# Patient Record
Sex: MALE | Race: WHITE | Employment: OTHER | URBAN - METROPOLITAN AREA
[De-identification: names, ages, dates, MRNs, and addresses within clinical notes are randomized per-mention and may not be internally consistent; named-entity substitution may affect disease eponyms.]

---

## 2023-05-03 DIAGNOSIS — I10 PRIMARY HYPERTENSION: ICD-10-CM

## 2023-05-03 DIAGNOSIS — K21.9 GASTROESOPHAGEAL REFLUX DISEASE WITHOUT ESOPHAGITIS: ICD-10-CM

## 2023-05-03 DIAGNOSIS — I10 PRIMARY HYPERTENSION: Primary | ICD-10-CM

## 2023-05-03 DIAGNOSIS — E78.2 MIXED HYPERLIPIDEMIA: ICD-10-CM

## 2023-05-03 RX ORDER — PANTOPRAZOLE SODIUM 40 MG/1
1 TABLET, DELAYED RELEASE ORAL 2 TIMES DAILY
COMMUNITY
Start: 2020-03-11 | End: 2023-05-03 | Stop reason: SDUPTHER

## 2023-05-03 RX ORDER — ATENOLOL 25 MG/1
25 TABLET ORAL DAILY
Qty: 90 TABLET | Refills: 3 | Status: SHIPPED | OUTPATIENT
Start: 2023-05-03 | End: 2023-05-04 | Stop reason: SDUPTHER

## 2023-05-03 RX ORDER — ATORVASTATIN CALCIUM 10 MG/1
1 TABLET, FILM COATED ORAL DAILY
COMMUNITY
Start: 2019-01-14 | End: 2023-05-03 | Stop reason: SDUPTHER

## 2023-05-03 RX ORDER — ATENOLOL 25 MG/1
25 TABLET ORAL DAILY
Qty: 90 TABLET | Refills: 0 | Status: SHIPPED | OUTPATIENT
Start: 2023-05-03 | End: 2023-05-03 | Stop reason: SDUPTHER

## 2023-05-03 RX ORDER — ATORVASTATIN CALCIUM 10 MG/1
10 TABLET, FILM COATED ORAL DAILY
Qty: 90 TABLET | Refills: 0 | Status: SHIPPED | OUTPATIENT
Start: 2023-05-03 | End: 2023-05-03 | Stop reason: SDUPTHER

## 2023-05-03 RX ORDER — PANTOPRAZOLE SODIUM 40 MG/1
40 TABLET, DELAYED RELEASE ORAL 2 TIMES DAILY
Qty: 90 TABLET | Refills: 3 | Status: SHIPPED | OUTPATIENT
Start: 2023-05-03 | End: 2023-05-04 | Stop reason: SDUPTHER

## 2023-05-03 RX ORDER — ATENOLOL 25 MG/1
1 TABLET ORAL DAILY
COMMUNITY
Start: 2020-11-05 | End: 2023-05-03 | Stop reason: SDUPTHER

## 2023-05-03 RX ORDER — PANTOPRAZOLE SODIUM 40 MG/1
40 TABLET, DELAYED RELEASE ORAL 2 TIMES DAILY
Qty: 90 TABLET | Refills: 0 | Status: SHIPPED | OUTPATIENT
Start: 2023-05-03 | End: 2023-05-03 | Stop reason: SDUPTHER

## 2023-05-03 RX ORDER — ATORVASTATIN CALCIUM 10 MG/1
10 TABLET, FILM COATED ORAL DAILY
Qty: 90 TABLET | Refills: 3 | Status: SHIPPED | OUTPATIENT
Start: 2023-05-03 | End: 2023-05-04 | Stop reason: SDUPTHER

## 2023-05-03 NOTE — TELEPHONE ENCOUNTER
----- Message from Alonzo Estrada sent at 5/2/2023  3:24 PM EDT -----  Regarding: Need prescription refills  Contact: [phone]  Gary Francois!    I typically receive a 90 day supply (through the mail) from the  Pharmacy in your building.    All three of my prescriptions are at zero refill.  Could you order a refill  for Atenolol, Pantoprazole, and Avorstatin?    Here's the rub - I had to create a new account in [facility] and my prescription details did not carry over.  My hope is that you have the specifics on file (?)      Thanks for your help!  Jose Antonio Estrada

## 2023-05-04 DIAGNOSIS — K21.9 GASTROESOPHAGEAL REFLUX DISEASE WITHOUT ESOPHAGITIS: ICD-10-CM

## 2023-05-04 DIAGNOSIS — I10 PRIMARY HYPERTENSION: ICD-10-CM

## 2023-05-04 RX ORDER — ATORVASTATIN CALCIUM 10 MG/1
10 TABLET, FILM COATED ORAL DAILY
Qty: 90 TABLET | Refills: 3 | Status: SHIPPED | OUTPATIENT
Start: 2023-05-04 | End: 2023-05-04

## 2023-05-04 RX ORDER — PANTOPRAZOLE SODIUM 40 MG/1
40 TABLET, DELAYED RELEASE ORAL 2 TIMES DAILY
Qty: 90 TABLET | Refills: 3 | Status: SHIPPED | OUTPATIENT
Start: 2023-05-04 | End: 2023-05-04

## 2023-05-04 RX ORDER — ATENOLOL 25 MG/1
25 TABLET ORAL DAILY
Qty: 90 TABLET | Refills: 3 | Status: SHIPPED | OUTPATIENT
Start: 2023-05-04 | End: 2023-05-04

## 2023-08-14 PROBLEM — B35.3 ATHLETE'S FOOT: Status: ACTIVE | Noted: 2023-08-14

## 2023-08-14 PROBLEM — H90.3 BILATERAL HIGH FREQUENCY SENSORINEURAL HEARING LOSS: Status: ACTIVE | Noted: 2023-08-14

## 2023-08-14 PROBLEM — R19.7 DIARRHEA: Status: ACTIVE | Noted: 2023-08-14

## 2023-08-14 PROBLEM — H52.13 MYOPIA OF BOTH EYES: Status: ACTIVE | Noted: 2023-08-14

## 2023-08-14 PROBLEM — K22.70 BARRETT'S ESOPHAGUS: Status: ACTIVE | Noted: 2023-08-14

## 2023-08-14 PROBLEM — I10 HTN (HYPERTENSION): Status: ACTIVE | Noted: 2023-08-14

## 2023-08-14 PROBLEM — K42.9 UMBILICAL HERNIA: Status: ACTIVE | Noted: 2023-08-14

## 2023-08-14 PROBLEM — E78.5 HYPERLIPIDEMIA: Status: ACTIVE | Noted: 2023-08-14

## 2023-08-14 PROBLEM — K21.9 GERD (GASTROESOPHAGEAL REFLUX DISEASE): Status: ACTIVE | Noted: 2023-08-14

## 2023-08-14 PROBLEM — L03.90 CELLULITIS: Status: ACTIVE | Noted: 2023-08-14

## 2023-08-14 PROBLEM — J30.2 SEASONAL ALLERGIES: Status: ACTIVE | Noted: 2023-08-14

## 2023-08-14 PROBLEM — H25.13 CATARACT, NUCLEAR SCLEROTIC, BOTH EYES: Status: ACTIVE | Noted: 2023-08-14

## 2023-08-15 DIAGNOSIS — Z00.00 HEALTHCARE MAINTENANCE: Primary | ICD-10-CM

## 2023-08-16 ENCOUNTER — LAB (OUTPATIENT)
Dept: LAB | Facility: LAB | Age: 63
End: 2023-08-16
Payer: COMMERCIAL

## 2023-08-16 DIAGNOSIS — Z00.00 HEALTHCARE MAINTENANCE: ICD-10-CM

## 2023-08-16 LAB
ALANINE AMINOTRANSFERASE (SGPT) (U/L) IN SER/PLAS: 21 U/L (ref 10–52)
ALBUMIN (G/DL) IN SER/PLAS: 3.9 G/DL (ref 3.4–5)
ALKALINE PHOSPHATASE (U/L) IN SER/PLAS: 61 U/L (ref 33–136)
ANION GAP IN SER/PLAS: 12 MMOL/L (ref 10–20)
ASPARTATE AMINOTRANSFERASE (SGOT) (U/L) IN SER/PLAS: 21 U/L (ref 9–39)
BASOPHILS (10*3/UL) IN BLOOD BY AUTOMATED COUNT: 0.07 X10E9/L (ref 0–0.1)
BASOPHILS/100 LEUKOCYTES IN BLOOD BY AUTOMATED COUNT: 1.2 % (ref 0–2)
BILIRUBIN TOTAL (MG/DL) IN SER/PLAS: 0.9 MG/DL (ref 0–1.2)
CALCIUM (MG/DL) IN SER/PLAS: 8.9 MG/DL (ref 8.6–10.3)
CARBON DIOXIDE, TOTAL (MMOL/L) IN SER/PLAS: 25 MMOL/L (ref 21–32)
CHLORIDE (MMOL/L) IN SER/PLAS: 109 MMOL/L (ref 98–107)
CHOLESTEROL (MG/DL) IN SER/PLAS: 122 MG/DL (ref 0–199)
CHOLESTEROL IN HDL (MG/DL) IN SER/PLAS: 41.9 MG/DL
CHOLESTEROL/HDL RATIO: 2.9
CREATININE (MG/DL) IN SER/PLAS: 0.99 MG/DL (ref 0.5–1.3)
EOSINOPHILS (10*3/UL) IN BLOOD BY AUTOMATED COUNT: 0.18 X10E9/L (ref 0–0.7)
EOSINOPHILS/100 LEUKOCYTES IN BLOOD BY AUTOMATED COUNT: 3.1 % (ref 0–6)
ERYTHROCYTE DISTRIBUTION WIDTH (RATIO) BY AUTOMATED COUNT: 12.1 % (ref 11.5–14.5)
ERYTHROCYTE MEAN CORPUSCULAR HEMOGLOBIN CONCENTRATION (G/DL) BY AUTOMATED: 34.2 G/DL (ref 32–36)
ERYTHROCYTE MEAN CORPUSCULAR VOLUME (FL) BY AUTOMATED COUNT: 91 FL (ref 80–100)
ERYTHROCYTES (10*6/UL) IN BLOOD BY AUTOMATED COUNT: 5.16 X10E12/L (ref 4.5–5.9)
GFR MALE: 86 ML/MIN/1.73M2
GLUCOSE (MG/DL) IN SER/PLAS: 99 MG/DL (ref 74–99)
HEMATOCRIT (%) IN BLOOD BY AUTOMATED COUNT: 47.1 % (ref 41–52)
HEMOGLOBIN (G/DL) IN BLOOD: 16.1 G/DL (ref 13.5–17.5)
IMMATURE GRANULOCYTES/100 LEUKOCYTES IN BLOOD BY AUTOMATED COUNT: 0.2 % (ref 0–0.9)
LDL: 56 MG/DL (ref 0–99)
LEUKOCYTES (10*3/UL) IN BLOOD BY AUTOMATED COUNT: 5.8 X10E9/L (ref 4.4–11.3)
LYMPHOCYTES (10*3/UL) IN BLOOD BY AUTOMATED COUNT: 1.03 X10E9/L (ref 1.2–4.8)
LYMPHOCYTES/100 LEUKOCYTES IN BLOOD BY AUTOMATED COUNT: 17.7 % (ref 13–44)
MONOCYTES (10*3/UL) IN BLOOD BY AUTOMATED COUNT: 0.63 X10E9/L (ref 0.1–1)
MONOCYTES/100 LEUKOCYTES IN BLOOD BY AUTOMATED COUNT: 10.8 % (ref 2–10)
NEUTROPHILS (10*3/UL) IN BLOOD BY AUTOMATED COUNT: 3.91 X10E9/L (ref 1.2–7.7)
NEUTROPHILS/100 LEUKOCYTES IN BLOOD BY AUTOMATED COUNT: 67 % (ref 40–80)
PLATELETS (10*3/UL) IN BLOOD AUTOMATED COUNT: 188 X10E9/L (ref 150–450)
POTASSIUM (MMOL/L) IN SER/PLAS: 4 MMOL/L (ref 3.5–5.3)
PROSTATE SPECIFIC AG (NG/ML) IN SER/PLAS: 1.32 NG/ML (ref 0–4)
PROTEIN TOTAL: 5.8 G/DL (ref 6.4–8.2)
SODIUM (MMOL/L) IN SER/PLAS: 142 MMOL/L (ref 136–145)
TRIGLYCERIDE (MG/DL) IN SER/PLAS: 121 MG/DL (ref 0–149)
UREA NITROGEN (MG/DL) IN SER/PLAS: 14 MG/DL (ref 6–23)
VLDL: 24 MG/DL (ref 0–40)

## 2023-08-16 PROCEDURE — 85025 COMPLETE CBC W/AUTO DIFF WBC: CPT

## 2023-08-16 PROCEDURE — 36415 COLL VENOUS BLD VENIPUNCTURE: CPT

## 2023-08-16 PROCEDURE — 80053 COMPREHEN METABOLIC PANEL: CPT

## 2023-08-16 PROCEDURE — 80061 LIPID PANEL: CPT

## 2023-08-16 PROCEDURE — 84153 ASSAY OF PSA TOTAL: CPT

## 2023-08-22 ENCOUNTER — OFFICE VISIT (OUTPATIENT)
Dept: PRIMARY CARE | Facility: CLINIC | Age: 63
End: 2023-08-22
Payer: COMMERCIAL

## 2023-08-22 VITALS
TEMPERATURE: 97.2 F | BODY MASS INDEX: 33.46 KG/M2 | HEART RATE: 58 BPM | RESPIRATION RATE: 16 BRPM | WEIGHT: 220.8 LBS | OXYGEN SATURATION: 98 % | SYSTOLIC BLOOD PRESSURE: 114 MMHG | DIASTOLIC BLOOD PRESSURE: 82 MMHG | HEIGHT: 68 IN

## 2023-08-22 DIAGNOSIS — Z00.00 HEALTHCARE MAINTENANCE: Primary | ICD-10-CM

## 2023-08-22 PROCEDURE — 99396 PREV VISIT EST AGE 40-64: CPT | Performed by: INTERNAL MEDICINE

## 2023-08-22 PROCEDURE — 3074F SYST BP LT 130 MM HG: CPT | Performed by: INTERNAL MEDICINE

## 2023-08-22 PROCEDURE — 1036F TOBACCO NON-USER: CPT | Performed by: INTERNAL MEDICINE

## 2023-08-22 PROCEDURE — 3079F DIAST BP 80-89 MM HG: CPT | Performed by: INTERNAL MEDICINE

## 2023-08-22 RX ORDER — CETIRIZINE HYDROCHLORIDE 5 MG/1
5 TABLET ORAL DAILY
COMMUNITY

## 2023-08-22 ASSESSMENT — PATIENT HEALTH QUESTIONNAIRE - PHQ9
SUM OF ALL RESPONSES TO PHQ9 QUESTIONS 1 AND 2: 0
2. FEELING DOWN, DEPRESSED OR HOPELESS: NOT AT ALL
1. LITTLE INTEREST OR PLEASURE IN DOING THINGS: NOT AT ALL

## 2023-08-22 ASSESSMENT — ENCOUNTER SYMPTOMS
DIARRHEA: 0
CONSTIPATION: 0
ROS GI COMMENTS: POSITIVE FOR HEARTBURN.
SHORTNESS OF BREATH: 1
BLOOD IN STOOL: 0

## 2023-08-22 NOTE — PROGRESS NOTES
The patient is here today for a physical exam.  The patient had a recent hearing test and will be now wearing two hearing aids.    Subjective   Patient ID: Alonzo Estrada is a 63 y.o. male who presents for Annual Exam.  He is generally doing well today.    The patient reports intermittent heartburn symptoms about once a month, that wakes him from sleep.  He is maintained with pantoprazole 40 mg once daily.  The patient plans to schedule a follow-up appointment with  from Gastroenterology.  He denies any hematochezia, melena, or bowel problems.     The patient mentions occasional mild dyspnea with exertion.  He denies any lower limb edema.    The patient recently completed audiometry and was fitted for a hearing aid device, that is working well.    The patient mentions hand numbness with specific sleep positions at night time.     The patient denies any vision changes.  He reports average sleep quality.       Review of Systems   Eyes:  Negative for visual disturbance.   Respiratory:  Positive for shortness of breath.    Cardiovascular:  Negative for leg swelling.   Gastrointestinal:  Negative for blood in stool, constipation and diarrhea.        Positive for heartburn.     Objective   Physical Exam  Constitutional:       Appearance: Normal appearance.   Neck:      Vascular: No carotid bruit.   Cardiovascular:      Rate and Rhythm: Normal rate and regular rhythm.      Heart sounds: Normal heart sounds.   Pulmonary:      Effort: Pulmonary effort is normal.      Breath sounds: Normal breath sounds.   Abdominal:      General: Bowel sounds are normal.      Palpations: Abdomen is soft.      Tenderness: There is no abdominal tenderness.   Skin:     General: Skin is warm and dry.   Neurological:      General: No focal deficit present.      Mental Status: He is alert and oriented to person, place, and time. Mental status is at baseline.   Psychiatric:         Mood and Affect: Mood normal.         Behavior:  Behavior normal.       Assessment/Plan       CPE Performed  -  Discussed healthy diet and regular exercise.    -  Physical exam overall unremarkable. Immunizations reviewed and updated accordingly. Healthy lifestyle choices discussed (tobacco avoidance, appropriate alcohol use, avoidance of illicit substances).   -  Patient is wearing seatbelt.   -  Screening lab work ordered as indicated.    -  Age appropriate screening tests reviewed with patient.        IMPRESSION/PLAN     Carpal Tunnel Syndrome   - Suspect this is coming from impingement at the elbow given his positioning at night when his symptoms start.  Advised the patient to try a wrist brace at night time to improve sleep.  Call the clinic if symptoms persist or worsen.    HTN   - /82 in office today, continue on atenolol 25mg QD.      HLD   - Stable, continue on atorvastatin 10mg QD.      Hamilton's   - Last EGD 12/2022, showed no evidence of Hamilton's Disease.  He continues with pantoprazole 40mg 1-2x/day. Will follow-up with Dr. Venegas in Gastroenterology.     Non-specific ST changes  - no chest pain currently- feeling well. Suspect normal variant. CT Cardiac score was 0. His BP is well controlled and he is on a statin.    Cellulitis in finger   - Previously ordered a referral with a Hand Surgeon.     Athlete's Foot   - Prescribed a topical antifungal     Health Maintenance   - Routine labs 8/2023. Last colonoscopy performed 09/2021. He is up to date on his TDAP and Covid-19 vaccinations.     Follow up for annual physical examination, call sooner if needed.        Scribe Attestation  By signing my name below, Nancie ALVAREZ Scribe   attest that this documentation has been prepared under the direction and in the presence of Cj Francois DO.

## 2023-10-06 ENCOUNTER — CLINICAL SUPPORT (OUTPATIENT)
Dept: AUDIOLOGY | Facility: CLINIC | Age: 63
End: 2023-10-06
Payer: COMMERCIAL

## 2023-10-06 DIAGNOSIS — H90.3 BILATERAL HIGH FREQUENCY SENSORINEURAL HEARING LOSS: Primary | ICD-10-CM

## 2023-10-06 ASSESSMENT — ENCOUNTER SYMPTOMS: OCCASIONAL FEELINGS OF UNSTEADINESS: 0

## 2023-10-06 ASSESSMENT — PAIN SCALES - GENERAL: PAINLEVEL_OUTOF10: 0 - NO PAIN

## 2023-10-06 ASSESSMENT — PAIN - FUNCTIONAL ASSESSMENT: PAIN_FUNCTIONAL_ASSESSMENT: 0-10

## 2023-10-06 NOTE — PROGRESS NOTES
AUDIOLOGY ADULT HEARING AID FITTING      Name:  Alonzo Estrada  :  1960  Age:  63 y.o.  Date of Service: 10/06/23    History:  Reason for visit:  Mr. Estrada is seen today for a hearing aid fitting of the below devices.   Chief Complaint   Patient presents with    Hearing Loss     Hearing Aid Fitting      The devices were programmed to meet the patient's initial needs.  Ran the feedback manager in each ear, with no feedback noticed in the office. Real ear measurements were performed at 100% acclimation and aids were adjusted to meet targets for soft (55 dB), average (65 dB) and loud (75 dB) speech sounds. Verification with real ear indicated targets were met in each ear. Began with 100% acclimation, however, the patient reported the devices were too loud. Decreased to 85% acclimation and in the office with quiet and background noise the patient was able to easily communicate at a distance of approximately 12-15 ft. Reported the aids sounded comfortable and clear and mentioned he felt he was hearing better then with the one aid alone. Stated although he could feel the right ear and it was slightly tickling he denied any pain in the ear. Slight concern for the right ear sensation and his own voice, however, stated he believes that he will become accustomed to the sound.  He was also provided a larger size dome to use if needed.     Right Ear:   Make/Model: Phonak Audeo L50 Slim   Dome/: small double dome/ #2 M   Serial Number: [id number]   Warranty: 2026 Loss & Damage and Repair    Left Ear:  Make/Model: Phonak Audeo L50 Slim   Dome/: small double dome/ #2 M   Serial Number: [id number]   Warranty: 2026 Loss & Damage and Repair  Using a large  for lithium ion batteries.   Aids dispensed on 10/06/2023 billed to  insurance Auth # [id number]     Device instructions were provided covering the following points:   1. Battery insertion and removal into the -the  patient understood.   2. Device insertion and removal-was able to insert/remove without difficulty and will monitor the fit and add retention lines if needed, especially to the right device.   3. Counseled to use the phone as normal without pressing anything additional on the hearing aid.     The patient did not have his apple information with him, so he reported he will download the simon at home and attempt to pair the hearing aids. If needed, will attempt to pair and configuration at the first follow up appointment.    Patient educated on:  1. Use/wear time for a minimum of 10 hours day.    2. Realistic expectations and need to return for fine-tuning  3. Communicating strategies to optimize hearing aid performance    Purchase agreement was completed and a copy was given to the patient.   Discussed the hearing aid warranty is provided by the hearing aid , and not [facility]. The  audiologist is available to facilitate device and accessory repairs under warranty.  However, it is the hearing aid  that is responsible for all costs related to repairs under warrantee. Charges for the replacement of a lost hearing aid under warrantee will be higher. Discussed that after the 90 initial fitting period,  Audiology will charge a fee to the patient for professional services to trouble shoot hearing aid device issues, to return the device to the manufacture for repair, to reprogram the device if needed, and to re-dispense the device to the patient. Office visit fees for hearing aid services are charged after the initial 90 day fitting period and the fee will vary based on the professional services provided, and can range from $50 - $200.  The patient expressed understanding for all fees and will return for the hearing aid fitting.     RECOMMENDATIONS:  * Continue medical follow up with managing physician.   * Wear hearing protection while in the presence of loud sounds.   * Continued use of  new hearing aids.   * Use effective communication strategies such as asking the speaker to gain attention prior to speaking, speaking in the same room, repeating words that were heard, etc.  * Return for scheduled follow up appointment.     PATIENT EDUCATION:   Questions were addressed and the patient was encouraged to contact our department should concerns arise.  The patient was seen from 9:00-10:00 am.

## 2023-10-27 ENCOUNTER — CLINICAL SUPPORT (OUTPATIENT)
Dept: AUDIOLOGY | Facility: CLINIC | Age: 63
End: 2023-10-27
Payer: COMMERCIAL

## 2023-10-27 DIAGNOSIS — H90.3 SENSORINEURAL HEARING LOSS (SNHL) OF BOTH EARS: Primary | ICD-10-CM

## 2023-10-27 NOTE — PROGRESS NOTES
AUDIOLOGY HEARING AID CHECK      Name:  Alonzo Estrada  :  1960  Age:  63 y.o.  Date of Visit: 10/27/23    History:  Reason for visit:  Mr. Estrada is seen today for a hearing aid check.  Chief Complaint   Patient presents with    Hearing Aid Check     New hearing aid in warranty check         Procedure:   Note, the patient is currently wearing the following devices:   Right Ear:              Make/Model: Phonak Audeo L50 Slim              Dome/: small double dome/ #2 M              Serial Number: [id number]              Warranty: 2026 Loss & Damage and Repair     Left Ear:  Make/Model: Phonak Audeo L50 Slim              Dome/: small double dome/ #2 M              Serial Number: [id number]              Warranty: 2026 Loss & Damage and Repair  Using a large  for lithium ion batteries.   Aids dispensed on 10/06/2023 billed to  insurance Auth # [id number]      The patient stated he has been performing well with the current hearing aids. Stated the devices are comfortable and has been staying in place.   Mentioned he initially thought the sound was loud, however, currently mentioned things are very comfortable. Believes it was initially due to the fact that he was wearing two aids instead of one. Reported he was able to hear all his environmental sounds like the microwave beeping even from a room away.   Stated he has been able to hear and understand speech and has wife has mentioned he has not been asking for as much repetition. He stated he has been really impressed with the sound and the aids.   Aided soundfield testing was performed with good results. Aided scores in the slight to mild hearing loss range with speech sounds at good or excellent results. See scanned materials.  Stated at this time he did not require any sound adjustments and would like to keep listening at his current program settings.  Paired the aids to his phone and discussed the [employer] simon.  Discussed the streaming feature on the phone and the differences needed to stream music/phone calls etc.   Demonstrated the cerushield wax trap and hearing aid cleaning.   Answered the patient's questions.   Recommended he return in approximately one month to double check programming. The patient was in agreement with the plant.     RECOMMENDATIONS:  * Continue medical follow up with managing physician.  * Continued use of new hearing aids.  * Use effective communication strategies such as asking the speaker to gain attention prior to speaking, speaking in the same room, repeating words that were heard, etc.   * Return for scheduled appointment.     PATIENT EDUCATION:   Questions were addressed and the patient was encouraged to contact our department should concerns arise.  The patient was seen from  9:00-9:30 am.

## 2023-10-28 ENCOUNTER — PHARMACY VISIT (OUTPATIENT)
Dept: PHARMACY | Facility: CLINIC | Age: 63
End: 2023-10-28
Payer: COMMERCIAL

## 2023-10-28 DIAGNOSIS — K21.9 GASTROESOPHAGEAL REFLUX DISEASE WITHOUT ESOPHAGITIS: ICD-10-CM

## 2023-10-28 PROCEDURE — RXMED WILLOW AMBULATORY MEDICATION CHARGE

## 2023-10-30 ENCOUNTER — PHARMACY VISIT (OUTPATIENT)
Dept: PHARMACY | Facility: CLINIC | Age: 63
End: 2023-10-30
Payer: COMMERCIAL

## 2023-10-30 PROCEDURE — RXMED WILLOW AMBULATORY MEDICATION CHARGE

## 2023-10-30 RX ORDER — PANTOPRAZOLE SODIUM 40 MG/1
TABLET, DELAYED RELEASE ORAL
Qty: 180 TABLET | Refills: 0 | Status: SHIPPED | OUTPATIENT
Start: 2023-10-30 | End: 2024-01-28 | Stop reason: SDUPTHER

## 2023-11-21 ENCOUNTER — CLINICAL SUPPORT (OUTPATIENT)
Dept: AUDIOLOGY | Facility: CLINIC | Age: 63
End: 2023-11-21
Payer: COMMERCIAL

## 2023-11-21 DIAGNOSIS — H90.3 SENSORINEURAL HEARING LOSS (SNHL) OF BOTH EARS: Primary | ICD-10-CM

## 2023-11-21 NOTE — PROGRESS NOTES
AUDIOLOGY HEARING AID CHECK      Name:  Alonzo Estrada  :  1960  Age:  63 y.o.  Date of Visit: 23    History:  Reason for visit:  Mr. Estrada is seen today for a hearing aid check.  Chief Complaint   Patient presents with    Hearing Aid Check     In warranty new hearing aid check     Procedure:   Note, the patient is currently wearing the following devices:   Right Ear:              Make/Model: Phonak Audeo L50 Slim              Dome/: small double dome/ #2 M              Serial Number: [id number]              Warranty: 2026 Loss & Damage and Repair     Left Ear:  Make/Model: Phonak Audeo L50 Slim              Dome/: small double dome/ #2 M              Serial Number: [id number]              Warranty: 2026 Loss & Damage and Repair  Using a large  for lithium ion batteries.   Aids dispensed on 10/06/2023 billed to  insurance Auth # [id number]      Stated he continues to do well with the current hearing aids in regard to the sound, communication and overall hearing.  Reported he has noticed the streaming to his iphone has been great, however, slightly bothered by all the loud system sounds.   Yesterday the right hearing aid appeared to not be functioning correctly as he noticed the green light came on and stayed on. He is questioning if he is hearing well from the right side.     Provided the instructions to disable the system sounds and the patient stated he will work on that at home.   Listening check indicated the right device was not functioning. Long press on the push button was able to turn off the aid and the green light. Placed in the  and the aid appeared to be functioning properly.   Connected to [employer] and updated the software. The patient declined any program changes.   After the reconnection, it appeared the device was functioning well. The patient will continue to wear and was instructed to shut down the aid again if there is a problem. He is  leaving for a cruise after Thanksgiving and was provided additional domes if needed.  He will return in one month for a final in warranty hearing aid check.    RECOMMENDATIONS:  * Continue medical follow up with managing physician.  * Continued use of new hearing aids.    * Use effective communication strategies such as asking the speaker to gain attention prior to speaking, speaking in the same room, repeating words that were heard, etc.   * Return for scheduled appointment.     PATIENT EDUCATION:   Questions were addressed and the patient was encouraged to contact our department should concerns arise.  The patient was seen from   9:00-9:30 am.

## 2024-01-08 ENCOUNTER — OFFICE VISIT (OUTPATIENT)
Dept: OPHTHALMOLOGY | Facility: CLINIC | Age: 64
End: 2024-01-08
Payer: COMMERCIAL

## 2024-01-08 DIAGNOSIS — H25.13 NUCLEAR SCLEROTIC CATARACT OF BOTH EYES: ICD-10-CM

## 2024-01-08 DIAGNOSIS — H52.4 PRESBYOPIA: ICD-10-CM

## 2024-01-08 DIAGNOSIS — H52.13 MYOPIA OF BOTH EYES: Primary | ICD-10-CM

## 2024-01-08 DIAGNOSIS — H52.223 REGULAR ASTIGMATISM OF BOTH EYES: ICD-10-CM

## 2024-01-08 PROCEDURE — 92014 COMPRE OPH EXAM EST PT 1/>: CPT | Performed by: OPTOMETRIST

## 2024-01-08 PROCEDURE — 92015 DETERMINE REFRACTIVE STATE: CPT | Performed by: OPTOMETRIST

## 2024-01-08 ASSESSMENT — REFRACTION_MANIFEST
METHOD_AUTOREFRACTION: 1
OS_ADD: +3.25
OS_CYLINDER: -0.50
OD_AXIS: 115
OD_SPHERE: -5.50
OS_CYLINDER: -1.00
OD_SPHERE: -5.00
OD_AXIS: 119
OS_AXIS: 020
OD_ADD: +3.25
OS_AXIS: 175
OD_CYLINDER: -1.50
OS_SPHERE: -10.25
OD_CYLINDER: -0.75
OS_SPHERE: -9.75

## 2024-01-08 ASSESSMENT — REFRACTION
OS_ADD: +3.25
OD_ADD: +3.25
OD_AXIS: 115
OS_AXIS: 060
OD_SPHERE: -5.50
OS_CYLINDER: -0.25
OS_SPHERE: -9.25
OD_CYLINDER: -0.75

## 2024-01-08 ASSESSMENT — VISUAL ACUITY
CORRECTION_TYPE: GLASSES
OS_CC: 20/20
METHOD: SNELLEN - LINEAR
OD_CC: 20/20
OS_CC+: -3
OD_CC+: -3

## 2024-01-08 ASSESSMENT — ENCOUNTER SYMPTOMS
GASTROINTESTINAL NEGATIVE: 0
MUSCULOSKELETAL NEGATIVE: 0
CARDIOVASCULAR NEGATIVE: 0
RESPIRATORY NEGATIVE: 0
EYES NEGATIVE: 0
ENDOCRINE NEGATIVE: 0
CONSTITUTIONAL NEGATIVE: 0
HEMATOLOGIC/LYMPHATIC NEGATIVE: 0
NEUROLOGICAL NEGATIVE: 0
PSYCHIATRIC NEGATIVE: 0
ALLERGIC/IMMUNOLOGIC NEGATIVE: 0

## 2024-01-08 ASSESSMENT — CONF VISUAL FIELD
OS_INFERIOR_TEMPORAL_RESTRICTION: 0
OD_NORMAL: 1
OS_SUPERIOR_NASAL_RESTRICTION: 0
OS_SUPERIOR_TEMPORAL_RESTRICTION: 0
OD_SUPERIOR_NASAL_RESTRICTION: 0
OD_SUPERIOR_TEMPORAL_RESTRICTION: 0
OD_INFERIOR_NASAL_RESTRICTION: 0
OD_INFERIOR_TEMPORAL_RESTRICTION: 0
OS_INFERIOR_NASAL_RESTRICTION: 0
OS_NORMAL: 1
METHOD: COUNTING FINGERS

## 2024-01-08 ASSESSMENT — REFRACTION_WEARINGRX
OS_AXIS: 060
OD_AXIS: 140
OS_SPHERE: -9.50
OS_ADD: +3.25
OD_CYLINDER: -0.75
OD_SPHERE: -6.25
SPECS_TYPE: PAL
OS_CYLINDER: -0.25
OD_ADD: +3.25

## 2024-01-08 ASSESSMENT — TONOMETRY
IOP_METHOD: GOLDMANN APPLANATION
OS_IOP_MMHG: 14
OD_IOP_MMHG: 14

## 2024-01-08 ASSESSMENT — CUP TO DISC RATIO
OS_RATIO: .3
OD_RATIO: .3

## 2024-01-08 ASSESSMENT — SLIT LAMP EXAM - LIDS
COMMENTS: GOOD POSITION
COMMENTS: GOOD POSITION

## 2024-01-08 ASSESSMENT — EXTERNAL EXAM - LEFT EYE: OS_EXAM: NORMAL

## 2024-01-08 ASSESSMENT — EXTERNAL EXAM - RIGHT EYE: OD_EXAM: NORMAL

## 2024-01-08 NOTE — PROGRESS NOTES
Assessment/Plan   Diagnoses and all orders for this visit:  Myopia of both eyes  Regular astigmatism of both eyes  Presbyopia  New spec rx released today per patient request. Ocular health wnl for age OU. Monitor 1 year or sooner prn. Refraction billed today. Dicussed small change OD.   Nuclear sclerotic cataract of both eyes  Patient's cataracts are not visually significant. Will monitor for changes. No indication for surgery at this time.

## 2024-01-19 ENCOUNTER — CLINICAL SUPPORT (OUTPATIENT)
Dept: AUDIOLOGY | Facility: CLINIC | Age: 64
End: 2024-01-19
Payer: COMMERCIAL

## 2024-01-19 DIAGNOSIS — H90.3 BILATERAL HIGH FREQUENCY SENSORINEURAL HEARING LOSS: Primary | ICD-10-CM

## 2024-01-19 NOTE — PROGRESS NOTES
AUDIOLOGY HEARING AID CHECK      Name:  Alonzo Estrada  :  1960  Age:  63 y.o.  Date of Visit: 24    History:  Reason for visit:  Mr. Estrada is seen today for a hearing aid check.  Chief Complaint   Patient presents with    Hearing Aid Check     Final in warranty hearing aid check     Reported he has been doing well and has not experienced any significant problems with the hearing aids. Stated he has been hearing well overall and likes the sound quality of the aids as well as wearing two devices. Reported he has been utilizing the streaming feature and finds it to be very helpful for phone calls as well as streaming music. Stated he is very pleased with the hearing aids. Denied any problems/concerns for the aids or the simon.    Procedure:   Note, the patient is currently wearing the following devices:   Right Ear:              Make/Model: Phonak Audeo L50 Slim              Dome/: small double dome/ #2 M              Serial Number: [id number]              Warranty: 2026 Loss & Damage and Repair     Left Ear:  Make/Model: Phonak Audeo L50 Slim              Dome/: small double dome/ #2 M              Serial Number: [id number]              Warranty: 2026 Loss & Damage and Repair  Using a large  for lithium ion batteries.   Aids dispensed on 10/06/2023 billed to  insurance Auth # [id number]      Reviewed cleaning/care of devices and how restart the simon for any connection problems. The patient stated he is doing well and does not have any significant concerns. No programming changes today. He will continue to wear and return in one year for a check or sooner if concerns arise.     RECOMMENDATIONS:  * Continue medical follow up with managing physicians.   * Continued use of new hearing aids.   * Wear hearing protection while in the presence of loud sounds.   * Use effective communication strategies such as asking the speaker to gain attention prior to speaking,  speaking in the same room, repeating words that were heard, etc.  * Return annually or as needed for hearing testing and hearing aid programming.    PATIENT EDUCATION:   Questions were addressed and the patient was encouraged to contact our department should concerns arise.  The patient was seen from  8:00-8:20 am .

## 2024-01-28 DIAGNOSIS — K21.9 GASTROESOPHAGEAL REFLUX DISEASE WITHOUT ESOPHAGITIS: ICD-10-CM

## 2024-01-29 ENCOUNTER — PATIENT MESSAGE (OUTPATIENT)
Dept: PRIMARY CARE | Facility: CLINIC | Age: 64
End: 2024-01-29
Payer: COMMERCIAL

## 2024-01-29 PROCEDURE — RXMED WILLOW AMBULATORY MEDICATION CHARGE

## 2024-01-29 RX ORDER — PANTOPRAZOLE SODIUM 40 MG/1
TABLET, DELAYED RELEASE ORAL
Qty: 180 TABLET | Refills: 0 | Status: SHIPPED | OUTPATIENT
Start: 2024-01-29 | End: 2024-04-28 | Stop reason: SDUPTHER

## 2024-01-29 NOTE — TELEPHONE ENCOUNTER
From: Alonzo Estrada  To: Cj Francois, DO  Sent: 1/29/2024 8:29 AM EST  Subject: Flu Shot    Hi Dr Francois!    Any chance that I can come in on Friday afternoon (Feb 2) for a flu shot?    Do I need an appointment? I'm going on two cruises in the next 45 days and think it might be time to get the flu shot. Note - I would just want the flu shot - not the COVID shot.    Jose Antonio Estrada

## 2024-02-01 ENCOUNTER — PHARMACY VISIT (OUTPATIENT)
Dept: PHARMACY | Facility: CLINIC | Age: 64
End: 2024-02-01
Payer: COMMERCIAL

## 2024-02-02 ENCOUNTER — CLINICAL SUPPORT (OUTPATIENT)
Dept: PRIMARY CARE | Facility: CLINIC | Age: 64
End: 2024-02-02
Payer: COMMERCIAL

## 2024-02-02 DIAGNOSIS — Z00.00 HEALTHCARE MAINTENANCE: Primary | ICD-10-CM

## 2024-02-02 PROCEDURE — 90686 IIV4 VACC NO PRSV 0.5 ML IM: CPT | Performed by: INTERNAL MEDICINE

## 2024-02-02 PROCEDURE — 90471 IMMUNIZATION ADMIN: CPT | Performed by: INTERNAL MEDICINE

## 2024-02-19 ENCOUNTER — APPOINTMENT (OUTPATIENT)
Dept: OPHTHALMOLOGY | Facility: CLINIC | Age: 64
End: 2024-02-19
Payer: COMMERCIAL

## 2024-04-28 DIAGNOSIS — I10 PRIMARY HYPERTENSION: ICD-10-CM

## 2024-04-28 DIAGNOSIS — K21.9 GASTROESOPHAGEAL REFLUX DISEASE WITHOUT ESOPHAGITIS: ICD-10-CM

## 2024-04-29 PROCEDURE — RXMED WILLOW AMBULATORY MEDICATION CHARGE

## 2024-04-29 RX ORDER — ATORVASTATIN CALCIUM 10 MG/1
10 TABLET, FILM COATED ORAL
Qty: 90 TABLET | Refills: 3 | Status: SHIPPED | OUTPATIENT
Start: 2024-04-29 | End: 2025-04-29

## 2024-04-29 RX ORDER — ATENOLOL 25 MG/1
25 TABLET ORAL DAILY
Qty: 90 TABLET | Refills: 3 | Status: SHIPPED | OUTPATIENT
Start: 2024-04-29 | End: 2025-04-29

## 2024-04-29 RX ORDER — PANTOPRAZOLE SODIUM 40 MG/1
TABLET, DELAYED RELEASE ORAL
Qty: 180 TABLET | Refills: 3 | Status: SHIPPED | OUTPATIENT
Start: 2024-04-29 | End: 2025-04-29

## 2024-05-02 ENCOUNTER — PHARMACY VISIT (OUTPATIENT)
Dept: PHARMACY | Facility: CLINIC | Age: 64
End: 2024-05-02
Payer: COMMERCIAL

## 2024-07-17 ENCOUNTER — PHARMACY VISIT (OUTPATIENT)
Dept: PHARMACY | Facility: CLINIC | Age: 64
End: 2024-07-17
Payer: COMMERCIAL

## 2024-07-17 ENCOUNTER — APPOINTMENT (OUTPATIENT)
Dept: CARDIOLOGY | Facility: HOSPITAL | Age: 64
End: 2024-07-17
Payer: COMMERCIAL

## 2024-07-17 ENCOUNTER — APPOINTMENT (OUTPATIENT)
Dept: RADIOLOGY | Facility: HOSPITAL | Age: 64
End: 2024-07-17
Payer: COMMERCIAL

## 2024-07-17 ENCOUNTER — HOSPITAL ENCOUNTER (EMERGENCY)
Facility: HOSPITAL | Age: 64
Discharge: HOME | End: 2024-07-17
Attending: STUDENT IN AN ORGANIZED HEALTH CARE EDUCATION/TRAINING PROGRAM
Payer: COMMERCIAL

## 2024-07-17 VITALS
SYSTOLIC BLOOD PRESSURE: 134 MMHG | OXYGEN SATURATION: 98 % | RESPIRATION RATE: 18 BRPM | DIASTOLIC BLOOD PRESSURE: 76 MMHG | WEIGHT: 210 LBS | HEIGHT: 69 IN | TEMPERATURE: 97.9 F | HEART RATE: 60 BPM | BODY MASS INDEX: 31.1 KG/M2

## 2024-07-17 DIAGNOSIS — R42 VERTIGO: Primary | ICD-10-CM

## 2024-07-17 LAB
ALBUMIN SERPL BCP-MCNC: 4.2 G/DL (ref 3.4–5)
ALP SERPL-CCNC: 56 U/L (ref 33–136)
ALT SERPL W P-5'-P-CCNC: 18 U/L (ref 10–52)
ANION GAP SERPL CALC-SCNC: 14 MMOL/L (ref 10–20)
AST SERPL W P-5'-P-CCNC: 19 U/L (ref 9–39)
BASOPHILS # BLD AUTO: 0.05 X10*3/UL (ref 0–0.1)
BASOPHILS NFR BLD AUTO: 0.9 %
BILIRUB SERPL-MCNC: 0.9 MG/DL (ref 0–1.2)
BUN SERPL-MCNC: 16 MG/DL (ref 6–23)
CALCIUM SERPL-MCNC: 8.8 MG/DL (ref 8.6–10.3)
CARDIAC TROPONIN I PNL SERPL HS: 3 NG/L (ref 0–20)
CHLORIDE SERPL-SCNC: 108 MMOL/L (ref 98–107)
CO2 SERPL-SCNC: 23 MMOL/L (ref 21–32)
CREAT SERPL-MCNC: 0.98 MG/DL (ref 0.5–1.3)
EGFRCR SERPLBLD CKD-EPI 2021: 87 ML/MIN/1.73M*2
EOSINOPHIL # BLD AUTO: 0.16 X10*3/UL (ref 0–0.7)
EOSINOPHIL NFR BLD AUTO: 2.9 %
ERYTHROCYTE [DISTWIDTH] IN BLOOD BY AUTOMATED COUNT: 12.1 % (ref 11.5–14.5)
GLUCOSE SERPL-MCNC: 134 MG/DL (ref 74–99)
HCT VFR BLD AUTO: 48.3 % (ref 41–52)
HGB BLD-MCNC: 16.6 G/DL (ref 13.5–17.5)
IMM GRANULOCYTES # BLD AUTO: 0.02 X10*3/UL (ref 0–0.7)
IMM GRANULOCYTES NFR BLD AUTO: 0.4 % (ref 0–0.9)
LYMPHOCYTES # BLD AUTO: 1.22 X10*3/UL (ref 1.2–4.8)
LYMPHOCYTES NFR BLD AUTO: 22 %
MAGNESIUM SERPL-MCNC: 1.75 MG/DL (ref 1.6–2.4)
MCH RBC QN AUTO: 30.9 PG (ref 26–34)
MCHC RBC AUTO-ENTMCNC: 34.4 G/DL (ref 32–36)
MCV RBC AUTO: 90 FL (ref 80–100)
MONOCYTES # BLD AUTO: 0.5 X10*3/UL (ref 0.1–1)
MONOCYTES NFR BLD AUTO: 9 %
NEUTROPHILS # BLD AUTO: 3.6 X10*3/UL (ref 1.2–7.7)
NEUTROPHILS NFR BLD AUTO: 64.8 %
NRBC BLD-RTO: 0 /100 WBCS (ref 0–0)
PLATELET # BLD AUTO: 146 X10*3/UL (ref 150–450)
POTASSIUM SERPL-SCNC: 3.9 MMOL/L (ref 3.5–5.3)
PROT SERPL-MCNC: 5.9 G/DL (ref 6.4–8.2)
RBC # BLD AUTO: 5.37 X10*6/UL (ref 4.5–5.9)
SODIUM SERPL-SCNC: 141 MMOL/L (ref 136–145)
WBC # BLD AUTO: 5.6 X10*3/UL (ref 4.4–11.3)

## 2024-07-17 PROCEDURE — 80053 COMPREHEN METABOLIC PANEL: CPT

## 2024-07-17 PROCEDURE — 70450 CT HEAD/BRAIN W/O DYE: CPT | Performed by: RADIOLOGY

## 2024-07-17 PROCEDURE — 36415 COLL VENOUS BLD VENIPUNCTURE: CPT

## 2024-07-17 PROCEDURE — 70450 CT HEAD/BRAIN W/O DYE: CPT

## 2024-07-17 PROCEDURE — RXMED WILLOW AMBULATORY MEDICATION CHARGE

## 2024-07-17 PROCEDURE — 85025 COMPLETE CBC W/AUTO DIFF WBC: CPT

## 2024-07-17 PROCEDURE — 2500000001 HC RX 250 WO HCPCS SELF ADMINISTERED DRUGS (ALT 637 FOR MEDICARE OP)

## 2024-07-17 PROCEDURE — 83735 ASSAY OF MAGNESIUM: CPT

## 2024-07-17 PROCEDURE — 96360 HYDRATION IV INFUSION INIT: CPT

## 2024-07-17 PROCEDURE — 99285 EMERGENCY DEPT VISIT HI MDM: CPT | Mod: 25

## 2024-07-17 PROCEDURE — 2500000004 HC RX 250 GENERAL PHARMACY W/ HCPCS (ALT 636 FOR OP/ED)

## 2024-07-17 PROCEDURE — 93005 ELECTROCARDIOGRAM TRACING: CPT

## 2024-07-17 PROCEDURE — 84484 ASSAY OF TROPONIN QUANT: CPT

## 2024-07-17 RX ORDER — MECLIZINE HYDROCHLORIDE 25 MG/1
25 TABLET ORAL ONCE
Status: COMPLETED | OUTPATIENT
Start: 2024-07-17 | End: 2024-07-17

## 2024-07-17 RX ORDER — MECLIZINE HYDROCHLORIDE 25 MG/1
25 TABLET ORAL 3 TIMES DAILY PRN
Qty: 20 TABLET | Refills: 0 | Status: SHIPPED | OUTPATIENT
Start: 2024-07-17 | End: 2024-07-27

## 2024-07-17 ASSESSMENT — LIFESTYLE VARIABLES
EVER FELT BAD OR GUILTY ABOUT YOUR DRINKING: NO
EVER HAD A DRINK FIRST THING IN THE MORNING TO STEADY YOUR NERVES TO GET RID OF A HANGOVER: NO
TOTAL SCORE: 0
HAVE YOU EVER FELT YOU SHOULD CUT DOWN ON YOUR DRINKING: NO
HAVE PEOPLE ANNOYED YOU BY CRITICIZING YOUR DRINKING: NO

## 2024-07-17 ASSESSMENT — PAIN SCALES - GENERAL
PAINLEVEL_OUTOF10: 0 - NO PAIN
PAINLEVEL_OUTOF10: 0 - NO PAIN

## 2024-07-17 ASSESSMENT — COLUMBIA-SUICIDE SEVERITY RATING SCALE - C-SSRS
6. HAVE YOU EVER DONE ANYTHING, STARTED TO DO ANYTHING, OR PREPARED TO DO ANYTHING TO END YOUR LIFE?: NO
2. HAVE YOU ACTUALLY HAD ANY THOUGHTS OF KILLING YOURSELF?: NO
1. IN THE PAST MONTH, HAVE YOU WISHED YOU WERE DEAD OR WISHED YOU COULD GO TO SLEEP AND NOT WAKE UP?: NO

## 2024-07-17 ASSESSMENT — PAIN - FUNCTIONAL ASSESSMENT
PAIN_FUNCTIONAL_ASSESSMENT: 0-10
PAIN_FUNCTIONAL_ASSESSMENT: 0-10

## 2024-07-17 NOTE — ED PROVIDER NOTES
EMERGENCY DEPARTMENT ENCOUNTER      Pt Name: Alonzo Estrada  MRN: [id number]  Birthdate 1960  Date of evaluation: 7/17/2024  Provider: John Martinez MD    CHIEF COMPLAINT       Chief Complaint   Patient presents with    Dizziness     Patient states he woke up yesterday morning with dizziness symptoms resolved. This morning patient woke up with dizziness and vomiting     Subjective    HISTORY OF PRESENT ILLNESS    64-year-old male with history of HTN and HLD presenting to the ED with complaint of dizziness.  Patient reports that he woke up this morning feeling dizzy as if the room was spinning and had been dreaming as if the room was spinning last night as well.  He states that yesterday he felt this way as well when he woke up but resolved after about 3 seconds.  She reports continued vertigo and vomited on his way to the ED in the car he believes due to the driving.  Denies any headache, anginal equivalents, URI symptoms, or nausea or diarrhea.      History provided by:  Patient      Nursing Notes were reviewed.    PAST MEDICAL HISTORY     Past Medical History:   Diagnosis Date    Cataract     Gastro-esophageal reflux disease without esophagitis     Gastroesophageal reflux disease, esophagitis presence not specified    Other specified cough 05/20/2016    Productive cough    Personal history of other diseases of the circulatory system     History of hypertension    Personal history of other specified conditions 10/31/2017    History of vomiting         SURGICAL HISTORY       Past Surgical History:   Procedure Laterality Date    HERNIA REPAIR  01/18/2018    Hernia Repair    OTHER SURGICAL HISTORY  08/14/2020    Inguinal hernia repair    OTHER SURGICAL HISTORY  07/14/2021    Vasectomy         CURRENT MEDICATIONS       Discharge Medication List as of 7/17/2024  9:35 AM        CONTINUE these medications which have NOT CHANGED    Details   atenolol (Tenormin) 25 mg tablet TAKE 1 TABLET BY MOUTH ONCE DAILY,  Starting Mon 4/29/2024, Until Tue 4/29/2025, Normal      atorvastatin (Lipitor) 10 mg tablet TAKE 1 TABLET (10 MG) BY MOUTH ONCE DAILY., Starting Mon 4/29/2024, Until Tue 4/29/2025 at 2359, Normal      cetirizine (ZyrTEC) 5 mg tablet Take 1 tablet (5 mg) by mouth once daily. prn, Historical Med      pantoprazole (ProtoNix) 40 mg EC tablet TAKE 1 TABLET (40 MG) BY MOUTH 2 TIMES A DAY., Starting Mon 4/29/2024, Until Tue 4/29/2025 at 2359, Normal             ALLERGIES     Patient has no known allergies.    FAMILY HISTORY       Family History   Problem Relation Name Age of Onset    Hypertension Mother      Breast cancer Mother      Cataracts Mother      Cancer Father      Glaucoma Neg Hx      Macular degeneration Neg Hx         SOCIAL HISTORY       Social History     Socioeconomic History    Marital status:    Tobacco Use    Smoking status: Never    Smokeless tobacco: Never   Vaping Use    Vaping status: Never Used   Substance and Sexual Activity    Alcohol use: Yes     Comment: 2 servings a week    Drug use: Never       SCREENINGS                       Objective    PHYSICAL EXAM    (up to 7 for level 4, 8 or more for level 5)     ED Triage Vitals [07/17/24 0736]   Temperature Heart Rate Respirations BP   36.6 °C (97.9 °F) 68 18 130/82      Pulse Ox Temp Source Heart Rate Source Patient Position   96 % Temporal Monitor Sitting      BP Location FiO2 (%)     Right arm --       Physical Exam  Vitals and nursing note reviewed.   Constitutional:       General: He is not in acute distress.     Appearance: He is well-developed.   HENT:      Head: Normocephalic and atraumatic.      Right Ear: External ear normal.      Left Ear: External ear normal.      Nose: Nose normal. No congestion or rhinorrhea.      Mouth/Throat:      Mouth: Mucous membranes are moist.      Pharynx: No oropharyngeal exudate or posterior oropharyngeal erythema.   Eyes:      General: No visual field deficit.        Right eye: No discharge.          Left eye: No discharge.      Extraocular Movements: Extraocular movements intact.      Conjunctiva/sclera: Conjunctivae normal.   Cardiovascular:      Rate and Rhythm: Normal rate and regular rhythm.      Pulses: Normal pulses.      Heart sounds: No murmur (grade 3 or above) heard.     No friction rub.   Pulmonary:      Effort: Pulmonary effort is normal. No respiratory distress.      Breath sounds: Normal breath sounds. No stridor. No wheezing or rales.   Abdominal:      General: There is no distension.      Palpations: Abdomen is soft.      Tenderness: There is no abdominal tenderness. There is no guarding.   Musculoskeletal:         General: No swelling, tenderness, deformity or signs of injury. Normal range of motion.      Cervical back: Neck supple.      Right lower leg: No edema.      Left lower leg: No edema.   Skin:     General: Skin is warm and dry.      Capillary Refill: Capillary refill takes less than 2 seconds.      Coloration: Skin is not jaundiced or pale.      Findings: No lesion or rash.   Neurological:      General: No focal deficit present.      Mental Status: He is alert and oriented to person, place, and time. Mental status is at baseline.      Cranial Nerves: Cranial nerves 2-12 are intact. No cranial nerve deficit, dysarthria or facial asymmetry.      Sensory: Sensation is intact. No sensory deficit.      Motor: Motor function is intact. No weakness or tremor.      Coordination: Coordination is intact. Coordination normal. Finger-Nose-Finger Test and Heel to Shin Test normal.   Psychiatric:         Mood and Affect: Mood normal.         Behavior: Behavior normal.         Thought Content: Thought content normal.         Judgment: Judgment normal.          DIAGNOSTIC RESULTS     LABS:  Labs Reviewed   CBC WITH AUTO DIFFERENTIAL - Abnormal       Result Value    WBC 5.6      nRBC 0.0      RBC 5.37      Hemoglobin 16.6      Hematocrit 48.3      MCV 90      MCH 30.9      MCHC 34.4      RDW 12.1       Platelets 146 (*)     Neutrophils % 64.8      Immature Granulocytes %, Automated 0.4      Lymphocytes % 22.0      Monocytes % 9.0      Eosinophils % 2.9      Basophils % 0.9      Neutrophils Absolute 3.60      Immature Granulocytes Absolute, Automated 0.02      Lymphocytes Absolute 1.22      Monocytes Absolute 0.50      Eosinophils Absolute 0.16      Basophils Absolute 0.05     COMPREHENSIVE METABOLIC PANEL - Abnormal    Glucose 134 (*)     Sodium 141      Potassium 3.9      Chloride 108 (*)     Bicarbonate 23      Anion Gap 14      Urea Nitrogen 16      Creatinine 0.98      eGFR 87      Calcium 8.8      Albumin 4.2      Alkaline Phosphatase 56      Total Protein 5.9 (*)     AST 19      Bilirubin, Total 0.9      ALT 18     MAGNESIUM - Normal    Magnesium 1.75     TROPONIN I, HIGH SENSITIVITY - Normal    Troponin I, High Sensitivity 3      Narrative:     Less than 99th percentile of normal range cutoff-  Female and children under 18 years old <14 ng/L; Male <21 ng/L: Negative  Repeat testing should be performed if clinically indicated.     Female and children under 18 years old 14-50 ng/L; Male 21-50 ng/L:  Consistent with possible cardiac damage and possible increased clinical   risk. Serial measurements may help to assess extent of myocardial damage.     >50 ng/L: Consistent with cardiac damage, increased clinical risk and  myocardial infarction. Serial measurements may help assess extent of   myocardial damage.      NOTE: Children less than 1 year old may have higher baseline troponin   levels and results should be interpreted in conjunction with the overall   clinical context.     NOTE: Troponin I testing is performed using a different   testing methodology at [facility] than at other   [facility]. Direct result comparisons should only   be made within the same method.       All other labs were within normal range or not returned as of this dictation.    Imaging  CT head wo IV contrast   Final  Result   No evidence of acute cortical infarct or intracranial hemorrhage.        No evidence of intracranial hemorrhage or displaced skull fracture.        MACRO:   None        Signed by: Xu Slaughterwendy 7/17/2024 8:24 AM   Dictation workstation:   [id number]               MDM/EMERGENCY DEPARTMENT COURSE:   Final Impression:   1. Vertigo        Medical Decision Making  64-year-old male presents to ED with complaint of vertigo since this morning that has been constant.  Patient is afebrile, sinus, normotensive, saturating well on room air, and in no acute distress.  On exam there is no nystagmus appreciated, NIH 0, and normal coordination.  Do not have a high suspicion for central cause at this time as patient had similar episode yesterday morning and had the sensation overnight as well with no nystagmus on exam.  Will obtain a CT head, cardiac workup, and provide meclizine as well as 1 hour of LR then reassess.  We did consider CT angio head and neck for possible posterior embolic event however patient has no nystagmus and has normal finger-to-nose and heel-to-shin testing.  No focal deficits appreciated.  Patient denies nausea currently.  History obtained from: Patient    EKG Interpretation: Rate: 70. Rhythm: Sinus.  NAD.  No acute ischemic changes. QTc: mildly prolonged at 469 ms.    Diagnostic interpretations performed by me: Per ED course below.  Social determinants of health affecting disposition: None  Management discussed with: Supervising attending, Dr. Harmon  Response to therapies provided: Complete resolution of vertigo    Patient reports sudden onset intense vertigo that is very positional.  He reports this happened yesterday morning and not fully resolved.  He reports it happened again this morning, no tinnitus, no ear pain, no numbness weakness or ataxia just a sensation that the room is spinning.  On my exam patient has no focal deficits including finger-to-nose and heel-to-shin.  Shared decision  making used since this seems peripheral in nature to try meclizine, IV fluids, check electrolytes and get a CT scan of the head and then reevaluate patient after medications.  I laid patient flat and rotate his head and he almost got full relief of his vertigo I think also supporting the diagnosis of peripheral vertigo.  Reassuring labs and CT.  Patient got significant improvement with medications fluids and was ambulating around the emergency department with no ataxia.  He was to be given meclizine to go home with and strict return precautions, patient and wife are comfortable with this plan.        ED Course as of 07/17/24 1713 Wed Jul 17, 2024   0931 Patient endorses resolution of dizziness following medication and fluids.  [ES]   0932 On my interpretation of labs, results are unimpressive for systemic inflammation or infection, acute anemia or blood loss, SERVANDO, ACS, hepatitis, or electrolyte abnormalities. [ES]      ED Course User Index  [ES] John Martinez MD         Diagnoses as of 07/17/24 1713   Vertigo        Patient and or family in agreement and understanding of treatment plan.  All questions answered.      I reviewed the case with the attending ED physician. The attending ED physician agrees with the plan. Patient and/or patient´s representative was counseled regarding labs, imaging, likely diagnosis, and plan. All questions were answered.    ED Medications administered this visit:    Medications   lactated Ringer's bolus 1,000 mL (0 mL intravenous Stopped 7/17/24 0857)   meclizine (Antivert) tablet 25 mg (25 mg oral Given 7/17/24 0802)       New Prescriptions from this visit:    Discharge Medication List as of 7/17/2024  9:35 AM        START taking these medications    Details   meclizine (Antivert) 25 mg tablet Take 1 tablet (25 mg) by mouth 3 times a day as needed for dizziness for up to 10 days., Starting Wed 7/17/2024, Until Sat 7/27/2024 at 2359, Normal             Follow-up:  Cj Francois,  DO  [address]  [facility] [address]  [facility] [place] [address] [phone]    In 2 days         (Please note that portions of this note were completed with a voice recognition program.  Efforts were made to edit the dictations but occasionally words are mis-transcribed.)    Procedures  Procedures      John Martinez MD  Resident  07/17/24 3844

## 2024-07-17 NOTE — DISCHARGE INSTRUCTIONS
Seek immediate medical attention should you have:  Sudden severe headache, persistent dizziness, fevers, weakness, confusion, vomiting, or any worsening or concerning symptoms.

## 2024-07-18 LAB
ATRIAL RATE: 71 BPM
P AXIS: 20 DEGREES
P OFFSET: 194 MS
P ONSET: 135 MS
PR INTERVAL: 166 MS
Q ONSET: 218 MS
QRS COUNT: 12 BEATS
QRS DURATION: 98 MS
QT INTERVAL: 432 MS
QTC CALCULATION(BAZETT): 469 MS
QTC FREDERICIA: 457 MS
R AXIS: -3 DEGREES
T AXIS: -1 DEGREES
T OFFSET: 434 MS
VENTRICULAR RATE: 71 BPM

## 2024-07-19 ENCOUNTER — OFFICE VISIT (OUTPATIENT)
Dept: PRIMARY CARE | Facility: CLINIC | Age: 64
End: 2024-07-19
Payer: COMMERCIAL

## 2024-07-19 VITALS
OXYGEN SATURATION: 96 % | SYSTOLIC BLOOD PRESSURE: 120 MMHG | BODY MASS INDEX: 33.33 KG/M2 | HEART RATE: 65 BPM | TEMPERATURE: 98.3 F | WEIGHT: 225 LBS | HEIGHT: 69 IN | DIASTOLIC BLOOD PRESSURE: 70 MMHG

## 2024-07-19 DIAGNOSIS — H81.10 BENIGN PAROXYSMAL POSITIONAL VERTIGO, UNSPECIFIED LATERALITY: Primary | ICD-10-CM

## 2024-07-19 PROCEDURE — 1036F TOBACCO NON-USER: CPT | Performed by: INTERNAL MEDICINE

## 2024-07-19 PROCEDURE — 3078F DIAST BP <80 MM HG: CPT | Performed by: INTERNAL MEDICINE

## 2024-07-19 PROCEDURE — 99214 OFFICE O/P EST MOD 30 MIN: CPT | Performed by: INTERNAL MEDICINE

## 2024-07-19 PROCEDURE — 3074F SYST BP LT 130 MM HG: CPT | Performed by: INTERNAL MEDICINE

## 2024-07-19 PROCEDURE — 3008F BODY MASS INDEX DOCD: CPT | Performed by: INTERNAL MEDICINE

## 2024-07-19 ASSESSMENT — ENCOUNTER SYMPTOMS: DIZZINESS: 1

## 2024-07-19 NOTE — PROGRESS NOTES
Subjective   Patient ID: Alonzo Estrada is a 64 y.o. male who presents for Hospital Follow-up ([facility] ER Vertigo).    The patient reports an episode of dizziness and vomiting on 7/17/2024 which resulted in presentation to the ED.  He completed preliminary blood work which was generally reassuring.  CT Head showed no evidence of acute cortical infarct, intracranial hemorrhage or displaced skull fracture.  Manipulation of the head in the ED resulted in relief of symptoms supporting a diagnosis of BPPV.  The patient was treated with 1L of Ringer's Lactate, and discharged home in stable condition with a prescription for meclizine.    The patient states that the meclizine 25mg TID as needed has been helping with the last dosage taken this morning.  He inquires about additional management options.       Review of Systems   Neurological:  Positive for dizziness.   All other systems reviewed and are negative.      Objective   Physical Exam  Constitutional:       Appearance: Normal appearance.   HENT:      Right Ear: Tympanic membrane and ear canal normal. There is no impacted cerumen.      Left Ear: Tympanic membrane and ear canal normal. There is no impacted cerumen.   Neck:      Vascular: No carotid bruit.   Cardiovascular:      Rate and Rhythm: Normal rate and regular rhythm.      Heart sounds: Normal heart sounds.   Pulmonary:      Effort: Pulmonary effort is normal.      Breath sounds: Normal breath sounds.   Abdominal:      General: Bowel sounds are normal.      Palpations: Abdomen is soft.      Tenderness: There is no abdominal tenderness.   Skin:     General: Skin is warm and dry.   Neurological:      General: No focal deficit present.      Mental Status: He is alert and oriented to person, place, and time. Mental status is at baseline.   Psychiatric:         Mood and Affect: Mood normal.         Behavior: Behavior normal.         Assessment/Plan   Problem List Items Addressed This Visit    None  Visit  Diagnoses         Codes    Benign paroxysmal positional vertigo, unspecified laterality    -  Primary H81.10    Relevant Orders    Referral to Physical Therapy            IMPRESSION/PLAN     BPPV  - Not elicited with eye movements on exam.  Likely BPPV as meclizine has been helping.  CT Head 7/17/2024 showed no evidence of acute cortical infarct, intracranial hemorrhage or displaced skull fracture.  Continue with meclizine 25mg up to TID prn, but recommended patient begin to taper down around 7/21/2024 if symptoms improve.  Ordered referral to Physical Therapy for vestibular rehabilitation.    HTN   - /82 in office today, continue on atenolol 25mg QD.      HLD   - Stable, continue on atorvastatin 10mg QD.      Hamilton's   - Last EGD 12/2022, showed no evidence of Hamilton's Disease.  He continues with pantoprazole 40mg 1-2x/day. Will follow-up with Dr. Venegas in Gastroenterology.     Carpal Tunnel Syndrome   - Suspect this is coming from impingement at the elbow given his positioning at night when his symptoms start.  Advised the patient to try a wrist brace at night time to improve sleep.  Call the clinic if symptoms persist or worsen.    Health Maintenance   - Routine labs 7/2024. Last colonoscopy performed 09/2021. He is up to date on his TDAP and Covid-19 vaccinations.     Follow up for annual physical examination, call sooner if needed.        Scribe Attestation  By signing my name below, INancie, Janiya   attest that this documentation has been prepared under the direction and in the presence of Cj Francois DO.   Nancie Ann 07/19/24 2:34 PM

## 2024-07-24 ENCOUNTER — PHARMACY VISIT (OUTPATIENT)
Dept: PHARMACY | Facility: CLINIC | Age: 64
End: 2024-07-24
Payer: COMMERCIAL

## 2024-07-24 ENCOUNTER — PATIENT MESSAGE (OUTPATIENT)
Dept: PRIMARY CARE | Facility: CLINIC | Age: 64
End: 2024-07-24
Payer: COMMERCIAL

## 2024-07-24 DIAGNOSIS — H81.10 BENIGN PAROXYSMAL POSITIONAL VERTIGO, UNSPECIFIED LATERALITY: Primary | ICD-10-CM

## 2024-07-24 PROCEDURE — RXMED WILLOW AMBULATORY MEDICATION CHARGE

## 2024-07-24 RX ORDER — MECLIZINE HYDROCHLORIDE 25 MG/1
25 TABLET ORAL 3 TIMES DAILY PRN
Qty: 30 TABLET | Refills: 2 | Status: SHIPPED | OUTPATIENT
Start: 2024-07-24 | End: 2025-07-24

## 2024-07-26 PROCEDURE — RXMED WILLOW AMBULATORY MEDICATION CHARGE

## 2024-07-30 ENCOUNTER — PHARMACY VISIT (OUTPATIENT)
Dept: PHARMACY | Facility: CLINIC | Age: 64
End: 2024-07-30
Payer: COMMERCIAL

## 2024-08-15 ENCOUNTER — CLINICAL SUPPORT (OUTPATIENT)
Dept: PHYSICAL THERAPY | Facility: CLINIC | Age: 64
End: 2024-08-15
Payer: COMMERCIAL

## 2024-08-15 DIAGNOSIS — H81.10 BENIGN PAROXYSMAL POSITIONAL VERTIGO, UNSPECIFIED LATERALITY: Primary | ICD-10-CM

## 2024-08-15 DIAGNOSIS — R42 DIZZINESS: ICD-10-CM

## 2024-08-15 PROCEDURE — 97161 PT EVAL LOW COMPLEX 20 MIN: CPT | Mod: GP

## 2024-08-15 ASSESSMENT — ENCOUNTER SYMPTOMS
LOSS OF SENSATION IN FEET: 0
OCCASIONAL FEELINGS OF UNSTEADINESS: 0
DEPRESSION: 0

## 2024-08-15 NOTE — PROGRESS NOTES
Physical Therapy Initial Evaluation:  Vestibular and Balance      Patient Name:  Alonzo Estrada   MRN:  [id number]   Date of Evaluation:  08/15/24   Time Calculation  Start Time: 0900  Visit Number:  1  Insurance Information:  2024 10% COINS, 3500 DED (MET) 5100 OOP MAX, 30 VS JAE YR PT/OT, AUTH REQ AFTER EVAL     1. Benign paroxysmal positional vertigo, unspecified laterality  Referral to Physical Therapy          Assessment: Alonzo Estrada is a 64 year old male referred to outpatient PT for dizziness.  At this time, patient showing signs of R PC canalithiasis.  Treated with 2 R Epley / CRM in which nystagmus severity was dramatically reduced.  Discussed at length presentation, BPPV, treatment, and when to know it is gone.  Given handouts to test 1x / day at home until sees this therapist again or until vertigo is abolished in first position of epley.  Based on patient's examination, concurrent co-morbidities, and presentation, patient's level of complexity is Low.  As a result, recommending continued PT services to facilitate improvement in CLOF.    Problems include:  Decreased knowledge of HEP and Dizziness / vertigo    Goals:  By Discharge patient will demo:  [place] in HEP  No falls during POC  Negative positional testing  DHI 0%    Potential to achieve rehab goals is fair.    Plan:  1 times every 1-3 weeks for a total of 6 PRN visits.  Re-assessment after that time.    Activities that may be performed include but are not limited to:  balance, gait, transfers, modalities (as appropriate), e-stim (as appropriate), canalith repositioning maneuvers, therapeutic exercise, therapeutic activities.    Alonzo Estrada in agreement with and understanding of all discussed this date.    ----------------------------------  ----------------------------------    Subjective:    Onset Date:  7/7/24    HPI:  Alonzo Estrada is a 64 year old male referred to outpatient PT for dizziness.  On July 6,  "woke up, got OOB, and felt a little dizzy for a few seconds.  The next evening was having dreams about being dizzy, got up the next morning and could hardly walk.  Decided to take a shower, then went to the ED because it wasn't going away.  Ran through all the testing and was negative, except told was had \"vertigo.\"  Was given Meclizine and it was \"magical.\"  Since then, things look better but it hasn't gone away.  The only time gets it is occasionally when gets out of bed.  Sometimes feels a little \"unusual\" when lays down and rolls over.  Sometimes feels it when bends forward, not coming up.  Felt like needed to come because isn't fully gone.  Did report some amount of room movement / spinning.  Sleeps on a wedge.    Patient Goal:  \"get this to go away\"     (note:  \"y\" = yes; \"n\" = no)    Hx of:  - Anxiety n  - Headaches / migraines (photo/phonophobia) ocular migraines, very irregularly  - Concussion n  - CVA n  - Neck pain n  - TMD n  - Motion sensitivity (car, boat) n  - Sinus infections n  - Ear infections n  - Heart conditions (afib, HTN, OH) HTN, meds  - DM n  - Autoimmune disorders n  - Thyroid disorders n  - Alcohol use moderate, weekends  - Caffeine use 3 cups    Hearing:  - Loss (sudden or gradual) y, over time  - Tinnitus n  - Audiogram n  - Autophony n    Eye Conditions: n    Vestibular Tests:   - VNG n  - VEMP n    Imaging:  - CT  see chart  - MRI see chart    Precautions: none    Steadi Fall Risk  One or more falls in the last year? No  How many Times?    Was the patient injured in the fall?    Has trouble stepping onto curb? No  Advised to use a cane or walker to get around safely? No  Often has to rush to toilet? No  Feels unsteady when walking? No  Has lost some feeling in feet? No  Often feels sad or depressed? No  Steadies self on furniture while walking at home? No  Takes medicine that makes them feel lightheaded or more tired than usual? No  Worried about Falling?    Takes medicine to sleep or " "improve mood? No  Needs to push with hands when rising from a chair? No          Pain:  none    ----------------------------------  ----------------------------------    OBJECTIVE    Observation: no gross abnormalities noted with functional mobility    ROM: c-spine ROM WFL all directions, slightly limited with extension    Strength: no gross abnormalities noted with functional mobility    Coordination: no gross abnormalities noted with functional mobility    Vestibular: (“g” = goggles, \"p\" = positive, \"n\" = negative, \"nt\" = not tested)  Occulomotor -  - ROM: n  - Smooth Pursuit:  n  - Horizontal Saccades:  n  - Vertical Saccades:  n  - Eye Alignment:  n  - Static Visual Acuity: n  - Cover:  n  - Uncover:  n  - Cross Cover:  n  - Skew Deviation:  n  - Convergence:  n    Vestibular-Specific:  - Spontaneous Nystagmus: n  - Gaze Evoked Nystagmus:  n  - Horizontal VOR: n  - Vertical VOR:  n  - R Head Thrust:  n  - L Head Thrust:  n  - DVA:  nt  - Head Shake Nystagmus (Horizontal): nt  - Head Shake Nystagmus (Vertical):  nt    Positional Testing:  - R DHP: p, R up torsional  - R HRT:  n  - R Sidelying Test:  nt  - L DHP: nt  - L HRT:  n  - L Sidelying Test: nt      Functional Mobility Assessment:  - Gait: no gross abnormalities noted with functional mobility  - Transfers:  no gross abnormalities noted with functional mobility  - Bed Mobility:  dizziness reported upon sit to supine and supine to sit    Outcomes:  DHI:  8%      TREATMENT:    CRM performed to affected side.  Educated extensively on BPPV and vestibular system, R Epley x 2.  Given handouts for testing / treating BPPV at home and information about the condition.  Educated on precautions to consider (laying on affected side, laying flat, positional changes, quick movements).  Educated on how the patient can know if condition is resolved.  ^ CRMs performed for treatment of condition and as \"reset\" of vestibular \"balance system\" for neuromuscular reeducation.     "

## 2024-08-27 ENCOUNTER — APPOINTMENT (OUTPATIENT)
Dept: PRIMARY CARE | Facility: CLINIC | Age: 64
End: 2024-08-27
Payer: COMMERCIAL

## 2024-08-29 ENCOUNTER — APPOINTMENT (OUTPATIENT)
Dept: PRIMARY CARE | Facility: CLINIC | Age: 64
End: 2024-08-29
Payer: COMMERCIAL

## 2024-08-29 VITALS
TEMPERATURE: 97.3 F | BODY MASS INDEX: 34.4 KG/M2 | WEIGHT: 227 LBS | RESPIRATION RATE: 18 BRPM | DIASTOLIC BLOOD PRESSURE: 60 MMHG | SYSTOLIC BLOOD PRESSURE: 120 MMHG | HEIGHT: 68 IN | OXYGEN SATURATION: 96 % | HEART RATE: 72 BPM

## 2024-08-29 DIAGNOSIS — Z00.00 HEALTHCARE MAINTENANCE: Primary | ICD-10-CM

## 2024-08-29 PROCEDURE — 1036F TOBACCO NON-USER: CPT | Performed by: INTERNAL MEDICINE

## 2024-08-29 PROCEDURE — 3074F SYST BP LT 130 MM HG: CPT | Performed by: INTERNAL MEDICINE

## 2024-08-29 PROCEDURE — 99396 PREV VISIT EST AGE 40-64: CPT | Performed by: INTERNAL MEDICINE

## 2024-08-29 PROCEDURE — 3078F DIAST BP <80 MM HG: CPT | Performed by: INTERNAL MEDICINE

## 2024-08-29 PROCEDURE — 3008F BODY MASS INDEX DOCD: CPT | Performed by: INTERNAL MEDICINE

## 2024-08-29 ASSESSMENT — ENCOUNTER SYMPTOMS
ARTHRALGIAS: 0
ROS GI COMMENTS: NEGATIVE FOR HEARTBURN.
CONSTIPATION: 0
FREQUENCY: 0
DIARRHEA: 0
DIZZINESS: 1
ABDOMINAL PAIN: 0

## 2024-08-29 ASSESSMENT — PATIENT HEALTH QUESTIONNAIRE - PHQ9
2. FEELING DOWN, DEPRESSED OR HOPELESS: NOT AT ALL
1. LITTLE INTEREST OR PLEASURE IN DOING THINGS: NOT AT ALL
SUM OF ALL RESPONSES TO PHQ9 QUESTIONS 1 AND 2: 0

## 2024-08-29 NOTE — PROGRESS NOTES
Patient here for a physical     Subjective   Patient ID: Alonzo Estrada is a 64 y.o. male who presents for Annual Exam.    The patient has been attending Physical Therapy for dizziness and is taking meclizine as directed, and feels that this is helping.     The patient will be scheduling a follow-up with  from Gastroenterology, and believes he will be scheduled for screening colonoscopy early next year.  He is still taking pantoprazole 40mg once daily, and has not experienced heartburn for some time.  The patient denies any abdominal pain, hematochezia, melena, or bowel problems.     The patient wears a hearing aid device, and finds that this is working well.  He denies any vision changes and wears prescription lenses.    The patient regularly with a Dentist, and reports good sleep quality.  He denies any significant urinary symptoms, or joint pain.         Review of Systems   HENT:  Positive for hearing loss.    Eyes:  Negative for visual disturbance.   Gastrointestinal:  Negative for abdominal pain, constipation and diarrhea.        Negative for heartburn.   Genitourinary:  Negative for frequency.   Musculoskeletal:  Negative for arthralgias.   Neurological:  Positive for dizziness.   Psychiatric/Behavioral:  Negative for suicidal ideas.        Objective   Physical Exam  Constitutional:       Appearance: Normal appearance.   Neck:      Vascular: No carotid bruit.   Cardiovascular:      Rate and Rhythm: Normal rate and regular rhythm.      Heart sounds: Normal heart sounds.   Pulmonary:      Effort: Pulmonary effort is normal.      Breath sounds: Normal breath sounds.   Abdominal:      General: Bowel sounds are normal.      Palpations: Abdomen is soft.      Tenderness: There is no abdominal tenderness.   Skin:     General: Skin is warm and dry.   Neurological:      General: No focal deficit present.      Mental Status: He is alert and oriented to person, place, and time. Mental status is at  baseline.   Psychiatric:         Mood and Affect: Mood normal.         Behavior: Behavior normal.         Assessment/Plan   Problem List Items Addressed This Visit    None  Visit Diagnoses         Codes    Healthcare maintenance    -  Primary Z00.00    Relevant Orders    Lipid panel    CBC and Auto Differential    Comprehensive metabolic panel    PSA              CPE Performed  -  Discussed healthy diet and regular exercise.    -  Physical exam overall unremarkable. Immunizations reviewed and updated accordingly. Healthy lifestyle choices discussed (tobacco avoidance, appropriate alcohol use, avoidance of illicit substances).   -  Patient is wearing seatbelt.   -  Screening lab work ordered as indicated.    -  Age appropriate screening tests reviewed with patient.        IMPRESSION/PLAN    HTN   - /82 in office today, continue on atenolol 25mg QD.      HLD   - Stable, continue on atorvastatin 10mg QD.      BPPV  - Improving.  Not elicited with eye movements on exam.  Likely BPPV as meclizine has been helping.  CT Head 7/17/2024 showed no evidence of acute cortical infarct, intracranial hemorrhage or displaced skull fracture.  Continue with meclizine 25mg up to TID prn, but recommended patient begin to taper down around 7/21/2024 if symptoms improve.  Following with Physical Therapy for vestibular rehabilitation.    Hamilton's   - Last EGD 12/2022, showed no evidence of Hamilton's Disease.  He continues with pantoprazole 40mg 1-2x/day. Will follow-up with Dr. Venegas in Gastroenterology.     Carpal Tunnel Syndrome   - Suspect this is coming from impingement at the elbow given his positioning at night when his symptoms start.  Advised the patient to try a wrist brace at night time to improve sleep.  Call the clinic if symptoms persist or worsen.     Health Maintenance   - Routine labs ordered including CBC, CMP, and a lipid panel to be completed in the fasting state. Added PSA. Last colonoscopy performed 09/2021.  He is up to date on his TDAP and Covid-19 vaccinations.     Follow up for annual physical examination, call sooner if needed.        Scribe Attestation  By signing my name below, I, Janiya Mckeon   attest that this documentation has been prepared under the direction and in the presence of Cj Francois DO.   Nancie Ann 08/29/24 9:08 AM

## 2024-08-30 ENCOUNTER — LAB (OUTPATIENT)
Dept: LAB | Facility: LAB | Age: 64
End: 2024-08-30
Payer: COMMERCIAL

## 2024-08-30 DIAGNOSIS — Z00.00 HEALTHCARE MAINTENANCE: ICD-10-CM

## 2024-08-30 LAB
ALBUMIN SERPL BCP-MCNC: 3.9 G/DL (ref 3.4–5)
ALP SERPL-CCNC: 64 U/L (ref 33–136)
ALT SERPL W P-5'-P-CCNC: 32 U/L (ref 10–52)
ANION GAP SERPL CALC-SCNC: 10 MMOL/L (ref 10–20)
AST SERPL W P-5'-P-CCNC: 27 U/L (ref 9–39)
BASOPHILS # BLD AUTO: 0.05 X10*3/UL (ref 0–0.1)
BASOPHILS NFR BLD AUTO: 0.8 %
BILIRUB SERPL-MCNC: 0.9 MG/DL (ref 0–1.2)
BUN SERPL-MCNC: 14 MG/DL (ref 6–23)
CALCIUM SERPL-MCNC: 9 MG/DL (ref 8.6–10.6)
CHLORIDE SERPL-SCNC: 107 MMOL/L (ref 98–107)
CHOLEST SERPL-MCNC: 129 MG/DL (ref 0–199)
CHOLESTEROL/HDL RATIO: 2.8
CO2 SERPL-SCNC: 29 MMOL/L (ref 21–32)
CREAT SERPL-MCNC: 1.04 MG/DL (ref 0.5–1.3)
EGFRCR SERPLBLD CKD-EPI 2021: 81 ML/MIN/1.73M*2
EOSINOPHIL # BLD AUTO: 0.14 X10*3/UL (ref 0–0.7)
EOSINOPHIL NFR BLD AUTO: 2.3 %
ERYTHROCYTE [DISTWIDTH] IN BLOOD BY AUTOMATED COUNT: 12.1 % (ref 11.5–14.5)
GLUCOSE SERPL-MCNC: 97 MG/DL (ref 74–99)
HCT VFR BLD AUTO: 45.6 % (ref 41–52)
HDLC SERPL-MCNC: 45.4 MG/DL
HGB BLD-MCNC: 15.7 G/DL (ref 13.5–17.5)
IMM GRANULOCYTES # BLD AUTO: 0.04 X10*3/UL (ref 0–0.7)
IMM GRANULOCYTES NFR BLD AUTO: 0.6 % (ref 0–0.9)
LDLC SERPL CALC-MCNC: 57 MG/DL
LYMPHOCYTES # BLD AUTO: 0.94 X10*3/UL (ref 1.2–4.8)
LYMPHOCYTES NFR BLD AUTO: 15.1 %
MCH RBC QN AUTO: 30.7 PG (ref 26–34)
MCHC RBC AUTO-ENTMCNC: 34.4 G/DL (ref 32–36)
MCV RBC AUTO: 89 FL (ref 80–100)
MONOCYTES # BLD AUTO: 0.77 X10*3/UL (ref 0.1–1)
MONOCYTES NFR BLD AUTO: 12.4 %
NEUTROPHILS # BLD AUTO: 4.27 X10*3/UL (ref 1.2–7.7)
NEUTROPHILS NFR BLD AUTO: 68.8 %
NON HDL CHOLESTEROL: 84 MG/DL (ref 0–149)
NRBC BLD-RTO: 0 /100 WBCS (ref 0–0)
PLATELET # BLD AUTO: 190 X10*3/UL (ref 150–450)
POTASSIUM SERPL-SCNC: 3.9 MMOL/L (ref 3.5–5.3)
PROT SERPL-MCNC: 5.9 G/DL (ref 6.4–8.2)
PSA SERPL-MCNC: 1.41 NG/ML
RBC # BLD AUTO: 5.11 X10*6/UL (ref 4.5–5.9)
SODIUM SERPL-SCNC: 142 MMOL/L (ref 136–145)
TRIGL SERPL-MCNC: 135 MG/DL (ref 0–149)
VLDL: 27 MG/DL (ref 0–40)
WBC # BLD AUTO: 6.2 X10*3/UL (ref 4.4–11.3)

## 2024-08-30 PROCEDURE — 80061 LIPID PANEL: CPT

## 2024-08-30 PROCEDURE — 36415 COLL VENOUS BLD VENIPUNCTURE: CPT

## 2024-08-30 PROCEDURE — 85025 COMPLETE CBC W/AUTO DIFF WBC: CPT

## 2024-08-30 PROCEDURE — 84153 ASSAY OF PSA TOTAL: CPT

## 2024-08-30 PROCEDURE — 80053 COMPREHEN METABOLIC PANEL: CPT

## 2024-09-03 ENCOUNTER — APPOINTMENT (OUTPATIENT)
Dept: PHYSICAL THERAPY | Facility: CLINIC | Age: 64
End: 2024-09-03
Payer: COMMERCIAL

## 2024-09-04 ENCOUNTER — TREATMENT (OUTPATIENT)
Dept: PHYSICAL THERAPY | Facility: CLINIC | Age: 64
End: 2024-09-04
Payer: COMMERCIAL

## 2024-09-04 DIAGNOSIS — H81.10 BPPV (BENIGN PAROXYSMAL POSITIONAL VERTIGO), UNSPECIFIED LATERALITY: Primary | ICD-10-CM

## 2024-09-04 PROCEDURE — 97530 THERAPEUTIC ACTIVITIES: CPT | Mod: GP

## 2024-09-04 NOTE — PROGRESS NOTES
Physical Therapy Treatment      Patient Name: Alonzo Estrada  MRN: [id number]  Today's Date: 9/4/2024  Visit #: 2  Insurance: 2024 10% COINS, 3500 DED (MET) 5100 OOP MAX, 30 VS JAE YR PT/OT, APPROVED 6 VS 8/15-9/15/2024   Time Calculation  Start Time: 0802  Stop Time: 0811  Time Calculation (min): 9 min    1. BPPV (benign paroxysmal positional vertigo), unspecified laterality            ASSESSMENT:  Patient no longer showing symptoms of BPPV.  Encouraged to contact this therapist PRN.  All questions answered to patient's satisfaction.    GOALS:  By Discharge patient will demo:  Cross in HEP  No falls during POC  Negative positional testing  DHI 0%    PLAN:  Keep visit for next week as just in case.  If no symptoms the day before, will cancel. And discharge.    SUBJECTIVE:  Doing well with no issues    OBJECTIVE:    TREATMENT:    Therapeutic Activity (36842): 9 minutes    All positional testing negative.  Discussed plan going forward, when to know if condition is back and when to treat it.

## 2024-09-12 ENCOUNTER — TREATMENT (OUTPATIENT)
Dept: PHYSICAL THERAPY | Facility: CLINIC | Age: 64
End: 2024-09-12
Payer: COMMERCIAL

## 2024-09-12 DIAGNOSIS — H81.10 BENIGN PAROXYSMAL VERTIGO, UNSPECIFIED LATERALITY: Primary | ICD-10-CM

## 2024-09-12 DIAGNOSIS — H81.10 BENIGN PAROXYSMAL VERTIGO, UNSPECIFIED EAR: ICD-10-CM

## 2024-09-12 PROCEDURE — 97530 THERAPEUTIC ACTIVITIES: CPT | Mod: GP

## 2024-09-12 NOTE — PROGRESS NOTES
Physical Therapy Treatment      Patient Name: Alonzo Estrada  MRN: [id number]  Today's Date: 9/12/2024  Visit #: 3  Insurance: 2024 10% COINS, 3500 DED (MET) 5100 OOP MAX, 30 VS JAE YR PT/OT, APPROVED 6 VS 8/15-9/15/2024   Time Calculation  Start Time: 0848  Stop Time: 0859  Time Calculation (min): 11 min    1. Benign paroxysmal vertigo, unspecified ear  Follow Up In Physical Therapy          ASSESSMENT:  Patient continues to no longer show symptoms of BPPV.  Encouraged to contact this therapist PRN.  All questions answered to patient's satisfaction.    GOALS:  By Discharge patient will demo:  [place] in HEP  No falls during POC  Negative positional testing  DHI 0%  ^^ met although not given DHI.    PLAN:  Discharge.    SUBJECTIVE:  Reports doing well but wants to make sure everything is still okay and know what to do if it comes back.    OBJECTIVE:    TREATMENT:    Therapeutic Activity (92300): 11 minutes    All positional testing negative.  Discussed plan going forward, when to know if condition is back and when to treat it.  Given Li to do if comes back and doesn't feel confident doing Epley alone.  Receptive.

## 2024-10-26 PROCEDURE — RXMED WILLOW AMBULATORY MEDICATION CHARGE

## 2024-10-31 ENCOUNTER — PHARMACY VISIT (OUTPATIENT)
Dept: PHARMACY | Facility: CLINIC | Age: 64
End: 2024-10-31
Payer: COMMERCIAL

## 2024-11-14 ENCOUNTER — PHARMACY VISIT (OUTPATIENT)
Dept: PHARMACY | Facility: CLINIC | Age: 64
End: 2024-11-14
Payer: COMMERCIAL

## 2024-11-14 DIAGNOSIS — H81.10 BENIGN PAROXYSMAL POSITIONAL VERTIGO, UNSPECIFIED LATERALITY: ICD-10-CM

## 2024-11-14 PROCEDURE — RXMED WILLOW AMBULATORY MEDICATION CHARGE

## 2024-11-14 RX ORDER — MECLIZINE HYDROCHLORIDE 25 MG/1
25 TABLET ORAL 3 TIMES DAILY PRN
Qty: 30 TABLET | Refills: 2 | Status: SHIPPED | OUTPATIENT
Start: 2024-11-14 | End: 2025-11-14

## 2024-12-16 ENCOUNTER — PATIENT MESSAGE (OUTPATIENT)
Dept: PRIMARY CARE | Facility: CLINIC | Age: 64
End: 2024-12-16
Payer: COMMERCIAL

## 2024-12-16 ENCOUNTER — HOSPITAL ENCOUNTER (OUTPATIENT)
Dept: RADIOLOGY | Facility: CLINIC | Age: 64
Discharge: HOME | End: 2024-12-16
Payer: COMMERCIAL

## 2024-12-16 DIAGNOSIS — M79.671 RIGHT FOOT PAIN: Primary | ICD-10-CM

## 2024-12-16 DIAGNOSIS — M79.671 RIGHT FOOT PAIN: ICD-10-CM

## 2024-12-16 PROCEDURE — 73630 X-RAY EXAM OF FOOT: CPT | Mod: RIGHT SIDE | Performed by: RADIOLOGY

## 2024-12-16 PROCEDURE — 73630 X-RAY EXAM OF FOOT: CPT | Mod: RT

## 2025-01-03 ENCOUNTER — APPOINTMENT (OUTPATIENT)
Dept: AUDIOLOGY | Facility: CLINIC | Age: 65
End: 2025-01-03
Payer: COMMERCIAL

## 2025-01-09 ENCOUNTER — CLINICAL SUPPORT (OUTPATIENT)
Dept: AUDIOLOGY | Facility: CLINIC | Age: 65
End: 2025-01-09
Payer: COMMERCIAL

## 2025-01-09 DIAGNOSIS — H90.3 SENSORINEURAL HEARING LOSS (SNHL) OF BOTH EARS: Primary | ICD-10-CM

## 2025-01-09 DIAGNOSIS — H93.13 TINNITUS OF BOTH EARS: ICD-10-CM

## 2025-01-09 PROCEDURE — 92557 COMPREHENSIVE HEARING TEST: CPT | Performed by: AUDIOLOGIST

## 2025-01-09 PROCEDURE — 92550 TYMPANOMETRY & REFLEX THRESH: CPT | Mod: 52 | Performed by: AUDIOLOGIST

## 2025-01-09 ASSESSMENT — PAIN - FUNCTIONAL ASSESSMENT: PAIN_FUNCTIONAL_ASSESSMENT: 0-10

## 2025-01-09 ASSESSMENT — PAIN SCALES - GENERAL: PAINLEVEL_OUTOF10: 0 - NO PAIN

## 2025-01-09 ASSESSMENT — ENCOUNTER SYMPTOMS: OCCASIONAL FEELINGS OF UNSTEADINESS: 0

## 2025-01-09 NOTE — LETTER
2025     Cj Francois DO  [address]  [facility], [address]  [facility] [address]    Patient: Alonzo Estrada   YOB: 1960   Date of Visit: 2025       Dear Dr. Cj Francois DO:    Thank you for referring Alonzo Estrada to me for evaluation. Below are my notes for this consultation.  If you have questions, please do not hesitate to call me. I look forward to following your patient along with you.       Sincerely,     Nikkie Lambert, FAN, CCC-A      CC: No Recipients  ______________________________________________________________________________________    AUDIOLOGY ADULT AUDIOMETRIC EVALUATION      Name:  Alonzo Estrada  :  1960  Age:  64 y.o.  Date of Evaluation: 25    History:  Reason for visit:  Mr. Alonzo Estrada was seen today for an evaluation of hearing.   The patient was kindly referred by their primary care physician, Cj Francois DO.  Chief Complaint   Patient presents with   • Hearing Loss     Reported he has returned for an annual hearing test and does not have any significant concerns for hearing changes at this time. The patient was seen on 23 for hearing testing, and results indicated a mild gradually sloping to severe sensorineural hearing loss, bilaterally. The patient was fit with binaural hearing aids on 10/6/23 and stated he has been performing well with the current hearing aids.   He does not have any concerns for hearing changes at this time. He feels like the hearing aids have been functioning well and he has been able to hear easily with the current settings.   History of bilateral non-pulsatile non-bothersome tinnitus, with no recent changes.   Mentioned this past July he experienced an onset of sudden vertigo with true spinning accompanied by nausea and emesis. He was treated in the ER and any significant medical concern, to include heart attack or stroke, was ruled out. Mentioned he was provided  Meclizine to take as needed. He did work with a vestibular physical therapist who was able to elicit positional vertigo in the Right PC with canalithiasis. The patient was treated with a right Epley/CRM maneuver and noticed significant improvement. Mentioned he was provided exercised to utilize at home and has not experienced any recent episodes of vertigo.   Stated he is doing well with his current hearing aids and continues to notice a benefit when communicating as well as when listening to the volume of the television.   Denied any current otalgia, aural fullness, ear pressure, dizziness/vertigo, ear surgery, recent ear/sinus infections, recent falls, significant noise exposure, diabetes, chemotherapy/radiation, heart/kidney problems, recent heart attack/strokes, new onset of headaches, sinus/throat concerns, speech/memory concerns, ear drainage, or sudden hearing loss.  Note previous case history from 7/31/23:  The patient reported a history of hearing loss for the past ten years. Stated he previously worked with [employer] and has a left hearing aid purchased ten years ago. He currently switched to  employee insurance through his wife, and is interested in obtaining newer products. Mentioned at the time he was only interested in a right hearing aid, however, currently believes he could benefit from two.  Stated he feels his hearing has decreased, as he has been experiencing more difficulty hearing and communicating recently. Feels the hearing loss has been gradual but has slowly progressed. History of bilateral non-pulsatile non-bothersome tinnitus, which is mild and does not interfere with daily living activities.   Denied any recent ear/sinus infections, current otalgia, dizziness/vertigo or significant noise exposure.     SCREENINGS  Steadi Fall Risk  One or more falls in the last year? No  How many Times?    Was the patient injured in the fall?    Has trouble stepping onto curb?     Advised to use a cane or walker to get around safely?    Often has to rush to toilet?    Feels unsteady when walking? No  Has lost some feeling in feet?    Often feels sad or depressed?    Steadies self on furniture while walking at home?    Takes medicine that makes them feel lightheaded or more tired than usual?    Worried about Falling? No  Takes medicine to sleep or improve mood?    Needs to push with hands when rising from a chair?      Domestic Violence Screening  Are you currently or have you recently been threatened or abused physically, emotionally, or secually by anyone? No  Do you feel UNSAFE going back to the place you are living? No  Pain Assessment  Pain Assessment: 0-10  0-10 (Numeric) Pain Score: 0 - No pain    EVALUATION     See Audiogram    RESULTS:    Otoscopic Evaluation:   Right Ear: Otoscopy revealed a clear healthy canal and a healthy tympanic membrane was visualized.   Left Ear: Otoscopy revealed a clear healthy canal and a healthy tympanic membrane was visualized.     Immittance:  Immittance Measures: 226 Hz   Right Ear: Tympanometric testing revealed a normal type A tympanogram with normal middle ear pressure and normal static compliance.  Left Ear: Tympanometric testing revealed a normal type A tympanogram with normal middle ear pressure and normal static compliance.    Right: Ipsilateral acoustic reflexes were present at, 500-4,000 Hz, at expected sensation levels.  Left Ear: Ipsilateral acoustic reflexes were present at, 500-4,000 Hz, at expected sensation levels.     Test technique:  Pure Tone Audiometry via insert earphones    Reliability:   excellent    Pure Tone Audiometry:    Right Ear: Audiometric testing indicated a mild sensorineural hearing loss through 500 Hz, sloping to a moderate mixed hearing loss at 1,000 Hz, sloping to a moderate to moderately severe sensorineural hearing loss through 4,000 Hz, sloping to a severe sensorineural hearing loss above.   Left Ear:    [FreeTextEntry1] : Patient is a 40yo F with HTN here for cardiac follow up. Patient has been doing well without any chest pain. Patient denies PND/orthopnea/edema/palpitations/syncope/claudication. Continues to work in customer service. Occasional a bit winded up stairs. REcently started on telmisartan. \par \par ROS: GI and  negative  Audiometric testing indicated a mild sensorineural hearing loss through 500 Hz, sloping to a moderate mixed hearing loss at 1,000 Hz, sloping to a moderate to moderately severe sensorineural hearing loss through 3,000 Hz, sloping to a severe sensorineural hearing loss above.       Speech Audiometry:   Right Ear:  Speech Reception Threshold (SRT) was obtained at 45 dBHL                  Word Recognition scores were good (88%) in quiet when words were presented at 85 dBHL  Left Ear:  Speech Reception Threshold (SRT) was obtained at  50 dBHL                  Word Recognition scores were good (88%) in quiet when words were presented at 90 dBHL  Testing was performed with recorded NU-6 speech words in quiet. Speech thresholds were in good agreement with the pure tone averages in each ear.     QuickSin testing indicated a score of 9.5 in the right ear and 12.5 in the left ear. Results are consistent with a moderate (7-15 dB) signal to noise ratio loss. Results suggest the patient may benefit from directional microphones in a hearing aid as well as a possible multiple microphone assistive device to help reduce the signal to noise ratio.   The patient stated he feels like is is performing well with the directional microphones and the noise management program in his current hearing aids.     IMPRESSIONS:  Today's test results are  consistent with a mild gradually sloping to severe sensorineural hearing loss, bilaterally .  Due to the degree of hearing loss, the hearing aids are appropriate to assist the brain and reduce communication effort. The patient was counseled with regard to the findings and will continue to utilize his current hearing aids.    When compared to the previous test results of 7/31/23, there has been essentially no change in either ear.     Note, the patient is currently wearing the following devices:   Right Ear:              Make/Model: [employer] L50 Slim              Dome/: small double  dome/ #2 M              Serial Number: [id number]              Warranty: 11/21/2026 Loss & Damage and Repair     Left Ear:  Make/Model: [employer] L50 Slim              Dome/: small double dome/ #2 M              Serial Number: [id number]              Warranty: 11/21/2026 Loss & Damage and Repair  Using a large  for lithium ion batteries.   Aids dispensed on 10/06/2023 billed to  insurance Auth # [id number]   Audiogram 1/9/25     Hearing aids were cleaned/checked and a listening check indicated the devices were functioning well.   Replaced with new domes and wax guards.   Connected to the software and performed real ear testing. Increased to 90% acclimation level and increased the soft sounds 2 clicks. In the office the patient stated he did not experience any significant change and mentioned he was hearing well. He will continue with the current programming changes. Note, due to patient preference, slightly underfit results based on real ear testing.   Provided additional supplies and he will continue to wear.     RECOMMENDATIONS:  * Continue medical follow up with Cj Francois DO.  * Retest as medically indicated, annually, or sooner if a change in hearing sensitivity or tinnitus is noticed.   * Wear hearing protection while in the presence of loud sounds.   * Use tinnitus coping strategies as needed, such as sound apps on a smart phone, utilizing calming noise in the room, running a fan at night, etc.   * Use effective communication strategies such as asking the speaker to gain attention prior to speaking, speaking in the same room, repeating words that were heard, etc.  * Return annually or as needed for hearing testing and hearing aid programming.    PATIENT EDUCATION:   Discussed results and recommendations with the patient and a copy of the hearing test was provided.  Questions were addressed and the patient was encouraged to contact our department should concerns arise.  The patient  was seen from 8:00-9:00 am.

## 2025-01-09 NOTE — PROGRESS NOTES
AUDIOLOGY ADULT AUDIOMETRIC EVALUATION      Name:  Alonzo Estrada  :  1960  Age:  64 y.o.  Date of Evaluation: 25    History:  Reason for visit:  Mr. Alonzo Estrada was seen today for an evaluation of hearing.   The patient was kindly referred by their primary care physician, Cj Francois DO.  Chief Complaint   Patient presents with    Hearing Loss     Reported he has returned for an annual hearing test and does not have any significant concerns for hearing changes at this time. The patient was seen on 23 for hearing testing, and results indicated a mild gradually sloping to severe sensorineural hearing loss, bilaterally. The patient was fit with binaural hearing aids on 10/6/23 and stated he has been performing well with the current hearing aids.   He does not have any concerns for hearing changes at this time. He feels like the hearing aids have been functioning well and he has been able to hear easily with the current settings.   History of bilateral non-pulsatile non-bothersome tinnitus, with no recent changes.   Mentioned this past July he experienced an onset of sudden vertigo with true spinning accompanied by nausea and emesis. He was treated in the ER and any significant medical concern, to include heart attack or stroke, was ruled out. Mentioned he was provided Meclizine to take as needed. He did work with a vestibular physical therapist who was able to elicit positional vertigo in the Right PC with canalithiasis. The patient was treated with a right Epley/CRM maneuver and noticed significant improvement. Mentioned he was provided exercised to utilize at home and has not experienced any recent episodes of vertigo.   Stated he is doing well with his current hearing aids and continues to notice a benefit when communicating as well as when listening to the volume of the television.   Denied any current otalgia, aural fullness, ear pressure, dizziness/vertigo, ear surgery, recent  ear/sinus infections, recent falls, significant noise exposure, diabetes, chemotherapy/radiation, heart/kidney problems, recent heart attack/strokes, new onset of headaches, sinus/throat concerns, speech/memory concerns, ear drainage, or sudden hearing loss.  Note previous case history from 7/31/23:  The patient reported a history of hearing loss for the past ten years. Stated he previously worked with [employer] and has a left hearing aid purchased ten years ago. He currently switched to  employee insurance through his wife, and is interested in obtaining newer products. Mentioned at the time he was only interested in a right hearing aid, however, currently believes he could benefit from two.  Stated he feels his hearing has decreased, as he has been experiencing more difficulty hearing and communicating recently. Feels the hearing loss has been gradual but has slowly progressed. History of bilateral non-pulsatile non-bothersome tinnitus, which is mild and does not interfere with daily living activities.   Denied any recent ear/sinus infections, current otalgia, dizziness/vertigo or significant noise exposure.     SCREENINGS  Steadi Fall Risk  One or more falls in the last year? No  How many Times?    Was the patient injured in the fall?    Has trouble stepping onto curb?    Advised to use a cane or walker to get around safely?    Often has to rush to toilet?    Feels unsteady when walking? No  Has lost some feeling in feet?    Often feels sad or depressed?    Steadies self on furniture while walking at home?    Takes medicine that makes them feel lightheaded or more tired than usual?    Worried about Falling? No  Takes medicine to sleep or improve mood?    Needs to push with hands when rising from a chair?      Domestic Violence Screening  Are you currently or have you recently been threatened or abused physically, emotionally, or secually by anyone? No  Do you feel UNSAFE going back to the  place you are living? No  Pain Assessment  Pain Assessment: 0-10  0-10 (Numeric) Pain Score: 0 - No pain    EVALUATION     See Audiogram    RESULTS:    Otoscopic Evaluation:   Right Ear: Otoscopy revealed a clear healthy canal and a healthy tympanic membrane was visualized.   Left Ear: Otoscopy revealed a clear healthy canal and a healthy tympanic membrane was visualized.     Immittance:  Immittance Measures: 226 Hz   Right Ear: Tympanometric testing revealed a normal type A tympanogram with normal middle ear pressure and normal static compliance.  Left Ear: Tympanometric testing revealed a normal type A tympanogram with normal middle ear pressure and normal static compliance.    Right: Ipsilateral acoustic reflexes were present at, 500-4,000 Hz, at expected sensation levels.  Left Ear: Ipsilateral acoustic reflexes were present at, 500-4,000 Hz, at expected sensation levels.     Test technique:  Pure Tone Audiometry via insert earphones    Reliability:   excellent    Pure Tone Audiometry:    Right Ear: Audiometric testing indicated a mild sensorineural hearing loss through 500 Hz, sloping to a moderate mixed hearing loss at 1,000 Hz, sloping to a moderate to moderately severe sensorineural hearing loss through 4,000 Hz, sloping to a severe sensorineural hearing loss above.   Left Ear:   Audiometric testing indicated a mild sensorineural hearing loss through 500 Hz, sloping to a moderate mixed hearing loss at 1,000 Hz, sloping to a moderate to moderately severe sensorineural hearing loss through 3,000 Hz, sloping to a severe sensorineural hearing loss above.       Speech Audiometry:   Right Ear:  Speech Reception Threshold (SRT) was obtained at 45 dBHL                  Word Recognition scores were good (88%) in quiet when words were presented at 85 dBHL  Left Ear:  Speech Reception Threshold (SRT) was obtained at  50 dBHL                  Word Recognition scores were good (88%) in quiet when words were presented at  90 dBHL  Testing was performed with recorded NU-6 speech words in quiet. Speech thresholds were in good agreement with the pure tone averages in each ear.     QuickSin testing indicated a score of 9.5 in the right ear and 12.5 in the left ear. Results are consistent with a moderate (7-15 dB) signal to noise ratio loss. Results suggest the patient may benefit from directional microphones in a hearing aid as well as a possible multiple microphone assistive device to help reduce the signal to noise ratio.   The patient stated he feels like is is performing well with the directional microphones and the noise management program in his current hearing aids.     IMPRESSIONS:  Today's test results are  consistent with a mild gradually sloping to severe sensorineural hearing loss, bilaterally .  Due to the degree of hearing loss, the hearing aids are appropriate to assist the brain and reduce communication effort. The patient was counseled with regard to the findings and will continue to utilize his current hearing aids.    When compared to the previous test results of 7/31/23, there has been essentially no change in either ear.     Note, the patient is currently wearing the following devices:   Right Ear:              Make/Model: Phonak Audeo L50 Slim              Dome/: small double dome/ #2 M              Serial Number: [id number]              Warranty: 11/21/2026 Loss & Damage and Repair     Left Ear:  Make/Model: Phonak Audeo L50 Slim              Dome/: small double dome/ #2 M              Serial Number: [id number]              Warranty: 11/21/2026 Loss & Damage and Repair  Using a large  for lithium ion batteries.   Aids dispensed on 10/06/2023 billed to  insurance Auth # [id number]   Audiogram 1/9/25     Hearing aids were cleaned/checked and a listening check indicated the devices were functioning well.   Replaced with new domes and wax guards.   Connected to the software and performed real  ear testing. Increased to 90% acclimation level and increased the soft sounds 2 clicks. In the office the patient stated he did not experience any significant change and mentioned he was hearing well. He will continue with the current programming changes. Note, due to patient preference, slightly underfit results based on real ear testing.   Provided additional supplies and he will continue to wear.     RECOMMENDATIONS:  * Continue medical follow up with Cj Francois DO.  * Retest as medically indicated, annually, or sooner if a change in hearing sensitivity or tinnitus is noticed.   * Wear hearing protection while in the presence of loud sounds.   * Use tinnitus coping strategies as needed, such as sound apps on a smart phone, utilizing calming noise in the room, running a fan at night, etc.   * Use effective communication strategies such as asking the speaker to gain attention prior to speaking, speaking in the same room, repeating words that were heard, etc.  * Return annually or as needed for hearing testing and hearing aid programming.    PATIENT EDUCATION:   Discussed results and recommendations with the patient and a copy of the hearing test was provided.  Questions were addressed and the patient was encouraged to contact our department should concerns arise.  The patient was seen from 8:00-9:00 am.

## 2025-01-13 ENCOUNTER — APPOINTMENT (OUTPATIENT)
Dept: OPHTHALMOLOGY | Facility: CLINIC | Age: 65
End: 2025-01-13
Payer: COMMERCIAL

## 2025-01-13 DIAGNOSIS — H52.223 REGULAR ASTIGMATISM OF BOTH EYES: ICD-10-CM

## 2025-01-13 DIAGNOSIS — H52.13 MYOPIA OF BOTH EYES: Primary | ICD-10-CM

## 2025-01-13 DIAGNOSIS — G43.109 OCULAR MIGRAINE: ICD-10-CM

## 2025-01-13 DIAGNOSIS — H25.13 NUCLEAR SCLEROTIC CATARACT OF BOTH EYES: ICD-10-CM

## 2025-01-13 DIAGNOSIS — H52.4 PRESBYOPIA: ICD-10-CM

## 2025-01-13 PROCEDURE — 92015 DETERMINE REFRACTIVE STATE: CPT | Performed by: OPTOMETRIST

## 2025-01-13 PROCEDURE — 92014 COMPRE OPH EXAM EST PT 1/>: CPT | Performed by: OPTOMETRIST

## 2025-01-13 ASSESSMENT — EXTERNAL EXAM - LEFT EYE: OS_EXAM: NORMAL

## 2025-01-13 ASSESSMENT — REFRACTION_MANIFEST
OS_SPHERE: -9.50
OS_AXIS: 010
OS_ADD: +3.25
OS_CYLINDER: -0.50
OD_SPHERE: -5.50
OD_CYLINDER: -0.75
OD_ADD: +3.25
OD_AXIS: 120

## 2025-01-13 ASSESSMENT — EXTERNAL EXAM - RIGHT EYE: OD_EXAM: NORMAL

## 2025-01-13 ASSESSMENT — REFRACTION
OS_CYLINDER: -0.50
OD_SPHERE: -5.75
OD_CYLINDER: -0.75
OS_ADD: +3.25
OD_ADD: +3.25
OS_SPHERE: -9.50
OD_AXIS: 120
OS_AXIS: 010

## 2025-01-13 ASSESSMENT — CONF VISUAL FIELD
OD_INFERIOR_TEMPORAL_RESTRICTION: 0
OS_INFERIOR_NASAL_RESTRICTION: 0
OS_SUPERIOR_NASAL_RESTRICTION: 0
OD_NORMAL: 1
OD_SUPERIOR_NASAL_RESTRICTION: 0
OS_SUPERIOR_TEMPORAL_RESTRICTION: 0
METHOD: COUNTING FINGERS
OS_INFERIOR_TEMPORAL_RESTRICTION: 0
OS_NORMAL: 1
OD_INFERIOR_NASAL_RESTRICTION: 0
OD_SUPERIOR_TEMPORAL_RESTRICTION: 0

## 2025-01-13 ASSESSMENT — ENCOUNTER SYMPTOMS
EYES NEGATIVE: 1
CARDIOVASCULAR NEGATIVE: 0
CONSTITUTIONAL NEGATIVE: 0
MUSCULOSKELETAL NEGATIVE: 0
NEUROLOGICAL NEGATIVE: 0
GASTROINTESTINAL NEGATIVE: 0
ENDOCRINE NEGATIVE: 0
HEMATOLOGIC/LYMPHATIC NEGATIVE: 0
RESPIRATORY NEGATIVE: 0
PSYCHIATRIC NEGATIVE: 0
ALLERGIC/IMMUNOLOGIC NEGATIVE: 0

## 2025-01-13 ASSESSMENT — VISUAL ACUITY
METHOD: SNELLEN - LINEAR
OD_CC+: -3
CORRECTION_TYPE: GLASSES
OS_CC+: -2
OS_CC: 20/20
OD_CC: 20/25

## 2025-01-13 ASSESSMENT — TONOMETRY
OD_IOP_MMHG: 14
OS_IOP_MMHG: 14
IOP_METHOD: GOLDMANN APPLANATION

## 2025-01-13 ASSESSMENT — SLIT LAMP EXAM - LIDS
COMMENTS: NORMAL
COMMENTS: NORMAL

## 2025-01-13 ASSESSMENT — REFRACTION_WEARINGRX
OD_CYLINDER: -0.75
OS_SPHERE: -9.50
OS_AXIS: 060
OD_ADD: +3.25
OD_SPHERE: -5.75
OS_CYLINDER: -0.25
OS_ADD: +3.25
SPECS_TYPE: PAL
OD_AXIS: 115

## 2025-01-13 ASSESSMENT — CUP TO DISC RATIO
OS_RATIO: .3
OD_RATIO: .3

## 2025-01-13 NOTE — PROGRESS NOTES
Assessment/Plan   Diagnoses and all orders for this visit:  Myopia of both eyes  Regular astigmatism of both eyes  Presbyopia  New spec rx released today per patient request. Ocular health wnl for age OU. Monitor 1 year or sooner prn. Refraction billed today. Pt consents to receiving glasses Rx today. Patient's/guardian's signature obtained to acknowledge and confirm that a paper copy of glasses Rx was given to patient in compliance with [facility] Eyeglass Rule. Electronic copy of Rx will also be available via [employer].     Nuclear sclerotic cataract of both eyes  Stable, not visually significant. Continue to monitor yearly for change.     Ocular migraine  Discussed ocular health WNL OU. Discussed typical symptom pattern of ocular migraine. Recommend f/up with PCP for optimization of BP control. Discussed can try to identify triggers and limit when able. Discussed that with any new, progressing, persistant flashes/floaters/change in vision patient should return to clinic as these could be indicators for retinal hole/tear/break. Discussed symptoms of TIA and that with any of these symptoms patient should report to ED as could be indicator of stroke. Patient voiced understanding. Monitor 1 year or sooner prn.

## 2025-01-22 PROCEDURE — RXMED WILLOW AMBULATORY MEDICATION CHARGE

## 2025-01-27 ENCOUNTER — PHARMACY VISIT (OUTPATIENT)
Dept: PHARMACY | Facility: CLINIC | Age: 65
End: 2025-01-27
Payer: COMMERCIAL

## 2025-03-25 ENCOUNTER — APPOINTMENT (OUTPATIENT)
Dept: GASTROENTEROLOGY | Facility: CLINIC | Age: 65
End: 2025-03-25
Payer: COMMERCIAL

## 2025-03-25 VITALS
HEART RATE: 61 BPM | HEIGHT: 69 IN | TEMPERATURE: 97.3 F | WEIGHT: 221 LBS | OXYGEN SATURATION: 95 % | BODY MASS INDEX: 32.73 KG/M2 | SYSTOLIC BLOOD PRESSURE: 113 MMHG | DIASTOLIC BLOOD PRESSURE: 70 MMHG | RESPIRATION RATE: 18 BRPM

## 2025-03-25 DIAGNOSIS — K22.70 BARRETT'S ESOPHAGUS WITHOUT DYSPLASIA: Primary | ICD-10-CM

## 2025-03-25 DIAGNOSIS — D12.6 TUBULAR ADENOMA OF COLON: ICD-10-CM

## 2025-03-25 PROCEDURE — 3008F BODY MASS INDEX DOCD: CPT | Performed by: INTERNAL MEDICINE

## 2025-03-25 PROCEDURE — RXMED WILLOW AMBULATORY MEDICATION CHARGE

## 2025-03-25 PROCEDURE — 3078F DIAST BP <80 MM HG: CPT | Performed by: INTERNAL MEDICINE

## 2025-03-25 PROCEDURE — 99204 OFFICE O/P NEW MOD 45 MIN: CPT | Performed by: INTERNAL MEDICINE

## 2025-03-25 PROCEDURE — 3074F SYST BP LT 130 MM HG: CPT | Performed by: INTERNAL MEDICINE

## 2025-03-25 PROCEDURE — 1036F TOBACCO NON-USER: CPT | Performed by: INTERNAL MEDICINE

## 2025-03-25 RX ORDER — SODIUM, POTASSIUM,MAG SULFATES 17.5-3.13G
1 SOLUTION, RECONSTITUTED, ORAL ORAL 2 TIMES DAILY
Qty: 1 EACH | Refills: 0 | Status: SHIPPED | OUTPATIENT
Start: 2025-03-25 | End: 2025-03-28

## 2025-03-25 ASSESSMENT — ENCOUNTER SYMPTOMS
ABDOMINAL PAIN: 0
COUGH: 1
VOMITING: 0
DECREASED APPETITE: 0
CONSTIPATION: 0
SHORTNESS OF BREATH: 0
DIARRHEA: 0
FEVER: 0
BLOATING: 0
HEMATOCHEZIA: 0
WEIGHT LOSS: 0
NAUSEA: 0
HEMATEMESIS: 0
CHILLS: 0
HEARTBURN: 1

## 2025-03-25 NOTE — PROGRESS NOTES
REASON FOR VISIT:  Hamilton's esophagus     HPI:  Alonzo Estrada is a 64 y.o. male who presents for a follow up appointment     Patient here for follow up in regards to his Barretts esophagus  Feels symptoms overall controlled with pantoprazole 40mg twice daily  - breakthrough sx every few months-usu with coffee at night- no alarm sx  Daily dry cough , has some difficulty with swallowing solids occasionally . Drinks extra sips of water   Sleeps with wedge pillow . Reduces acidic foods. No coffee after 4pm   Denies any lower GI symptoms        Med list notable for pantoprazole 40mg oral twice daily     Labs notable for    No fhx of CRC.       Prev endoscopic eval:   >> EGD 12/2022  Impression:      - Esophageal mucosal changes secondary to established                          short-segment Hamilton's disease, classified as                          Hamilton's stage C0-M1 per [place] criteria. Biopsied.                         - 4 cm hiatal hernia.                         - Normal examined duodenum.  FINAL DIAGNOSIS   A.  ESOPHAGUS AT 36 CM, BIOPSY:    --GASTRIC TYPE MUCOSA WITH CHRONIC INFLAMMATION AND REACTIVE FEATURES.     Note: No intestinal metaplasia is identified.     >> Colonoscopy 09/2021  Impression:      - The examined portion of the ileum was normal.                         - Diverticulosis in the entire examined colon.                         - External and internal hemorrhoids.                         - One 8 mm polyp in the ascending colon, removed                          piecemeal using a cold snare. Resected and retrieved.                         - One 7 mm polyp in the descending colon, removed with                          a cold snare. Resected and retrieved.  FINAL DIAGNOSIS   A.  ASCENDING COLON, POLYPECTOMY X1:    --COLONIC MUCOSA WITH MILD HYPERPLASTIC CHANGES AND FOCAL ACTIVE COLITIS, SEE   NOTE.     Note: Multiple deeper levels were examined.       B.  DESCENDING COLON, POLYPECTOMY X1:    --TUBULAR ADENOMA.     REVIEW OF SYSTEMS    Review of Systems   Constitutional: Negative for chills, decreased appetite, fever and weight loss.   Cardiovascular:  Negative for chest pain.   Respiratory:  Positive for cough. Negative for shortness of breath.    Gastrointestinal:  Positive for dysphagia and heartburn. Negative for bloating, abdominal pain, constipation, diarrhea, hematemesis, hematochezia, melena, nausea and vomiting.          No Known Allergies    Past Medical History:   Diagnosis Date    Cataract     Gastro-esophageal reflux disease without esophagitis     Gastroesophageal reflux disease, esophagitis presence not specified    Other specified cough 05/20/2016    Productive cough    Personal history of other diseases of the circulatory system     History of hypertension    Personal history of other specified conditions 10/31/2017    History of vomiting       Past Surgical History:   Procedure Laterality Date    HERNIA REPAIR  01/18/2018    Hernia Repair    OTHER SURGICAL HISTORY  08/14/2020    Inguinal hernia repair    OTHER SURGICAL HISTORY  07/14/2021    Vasectomy       Family History   Problem Relation Name Age of Onset    Hypertension Mother      Breast cancer Mother      Cataracts Mother      Cancer Father      Glaucoma Neg Hx      Macular degeneration Neg Hx         Social History     Tobacco Use    Smoking status: Never    Smokeless tobacco: Never   Substance Use Topics    Alcohol use: Yes     Comment: 2 servings a week       Current Outpatient Medications   Medication Sig Dispense Refill    atenolol (Tenormin) 25 mg tablet Take 1 tablet (25 mg) by mouth once daily. 90 tablet 3    atorvastatin (Lipitor) 10 mg tablet Take 1 tablet (10 mg) by mouth once daily. 90 tablet 3    cetirizine (ZyrTEC) 5 mg tablet Take 1 tablet (5 mg) by mouth once daily. prn      meclizine (Antivert) 25 mg tablet Take 1 tablet (25 mg) by mouth 3 times a day as needed for dizziness. 30 tablet 2    pantoprazole  (ProtoNix) 40 mg EC tablet TAKE 1 TABLET (40 MG) BY MOUTH 2 TIMES A DAY. 180 tablet 3     No current facility-administered medications for this visit.       PHYSICAL EXAM:  There were no vitals taken for this visit.     Physical Exam  Constitutional:       Comments: Awake   HENT:      Head: Normocephalic.   Cardiovascular:      Rate and Rhythm: Normal rate and regular rhythm.   Pulmonary:      Effort: Pulmonary effort is normal.      Breath sounds: Normal breath sounds.   Abdominal:      General: Bowel sounds are normal.      Palpations: Abdomen is soft.   Neurological:      Mental Status: He is alert.   Psychiatric:         Mood and Affect: Mood normal.          ASSESSMENT  64-year-old male presents for follow-up of short segment Hamilton's and history of tubular adenomas on his last colonoscopy.  He is overall feeling well taking pantoprazole twice a day.  He reports rare breakthrough symptoms usually with drinking coffee in the evening.  He denies any alarm symptoms.  His last EGD was in 2022 where biopsies were negative for Hamilton's.  He will be due at the end of this year for surveillance.  His last colonoscopy was in 2021 where he had an 8 mm tubular adenoma that was removed.  He will be due in 2026 however we can complete both procedures towards the end of this year.  He will continue his pantoprazole twice a day and I will see him in follow-up in 1 year  I would recommend that his first-degree relatives start screening colonoscopy at age 40      Evaluation requested by Dr. Cj Francois DO.   My final recommendations will be communicated back to the requesting physician by way of shared Medical record or letter to requesting physician via fax.      Attending Note:   I have personally performed a face to face assessment of this Patient, which included an interview, physical exam and Assessment and Plan.  I have reviewed and confirmed the history and key findings as documented by the Medical Assistant and  edited as appropriate.     Signature: Rylee Venegas MD    Date: 3/25/2025  Time: 7:00 AM

## 2025-03-27 ENCOUNTER — PHARMACY VISIT (OUTPATIENT)
Dept: PHARMACY | Facility: CLINIC | Age: 65
End: 2025-03-27
Payer: COMMERCIAL

## 2025-04-22 DIAGNOSIS — K21.9 GASTROESOPHAGEAL REFLUX DISEASE WITHOUT ESOPHAGITIS: ICD-10-CM

## 2025-04-22 DIAGNOSIS — I10 PRIMARY HYPERTENSION: ICD-10-CM

## 2025-04-22 PROCEDURE — RXMED WILLOW AMBULATORY MEDICATION CHARGE

## 2025-04-22 RX ORDER — ATORVASTATIN CALCIUM 10 MG/1
10 TABLET, FILM COATED ORAL
Qty: 90 TABLET | Refills: 3 | Status: SHIPPED | OUTPATIENT
Start: 2025-04-22 | End: 2026-04-22

## 2025-04-22 RX ORDER — PANTOPRAZOLE SODIUM 40 MG/1
40 TABLET, DELAYED RELEASE ORAL 2 TIMES DAILY
Qty: 60 TABLET | Refills: 11 | Status: SHIPPED | OUTPATIENT
Start: 2025-04-22 | End: 2026-04-22

## 2025-04-22 RX ORDER — ATENOLOL 25 MG/1
25 TABLET ORAL DAILY
Qty: 90 TABLET | Refills: 3 | Status: SHIPPED | OUTPATIENT
Start: 2025-04-22 | End: 2026-04-22

## 2025-04-24 ENCOUNTER — PHARMACY VISIT (OUTPATIENT)
Dept: PHARMACY | Facility: CLINIC | Age: 65
End: 2025-04-24
Payer: COMMERCIAL

## 2025-07-23 PROCEDURE — RXMED WILLOW AMBULATORY MEDICATION CHARGE

## 2025-07-25 ENCOUNTER — PHARMACY VISIT (OUTPATIENT)
Dept: PHARMACY | Facility: CLINIC | Age: 65
End: 2025-07-25
Payer: COMMERCIAL

## 2025-08-15 DIAGNOSIS — K21.9 GASTROESOPHAGEAL REFLUX DISEASE WITHOUT ESOPHAGITIS: ICD-10-CM

## 2025-08-15 RX ORDER — PANTOPRAZOLE SODIUM 40 MG/1
40 TABLET, DELAYED RELEASE ORAL 2 TIMES DAILY
Qty: 60 TABLET | Refills: 0 | Status: SHIPPED | OUTPATIENT
Start: 2025-08-15 | End: 2026-08-15

## 2025-08-28 ENCOUNTER — APPOINTMENT (OUTPATIENT)
Dept: PRIMARY CARE | Facility: CLINIC | Age: 65
End: 2025-08-28
Payer: COMMERCIAL

## 2025-09-02 ENCOUNTER — HOSPITAL ENCOUNTER (EMERGENCY)
Facility: HOSPITAL | Age: 65
Discharge: HOME | End: 2025-09-02
Attending: EMERGENCY MEDICINE
Payer: COMMERCIAL

## 2025-09-02 ENCOUNTER — TELEPHONE (OUTPATIENT)
Dept: PRIMARY CARE | Facility: CLINIC | Age: 65
End: 2025-09-02
Payer: COMMERCIAL

## 2025-09-02 ENCOUNTER — APPOINTMENT (OUTPATIENT)
Dept: CARDIOLOGY | Facility: HOSPITAL | Age: 65
End: 2025-09-02
Payer: COMMERCIAL

## 2025-09-02 ENCOUNTER — TELEPHONE (OUTPATIENT)
Dept: GASTROENTEROLOGY | Facility: CLINIC | Age: 65
End: 2025-09-02
Payer: COMMERCIAL

## 2025-09-02 ENCOUNTER — APPOINTMENT (OUTPATIENT)
Dept: RADIOLOGY | Facility: HOSPITAL | Age: 65
End: 2025-09-02
Payer: COMMERCIAL

## 2025-09-02 ENCOUNTER — PHARMACY VISIT (OUTPATIENT)
Dept: PHARMACY | Facility: CLINIC | Age: 65
End: 2025-09-02
Payer: COMMERCIAL

## 2025-09-02 VITALS
DIASTOLIC BLOOD PRESSURE: 83 MMHG | BODY MASS INDEX: 32.58 KG/M2 | SYSTOLIC BLOOD PRESSURE: 150 MMHG | RESPIRATION RATE: 16 BRPM | TEMPERATURE: 96.3 F | HEART RATE: 63 BPM | OXYGEN SATURATION: 96 % | WEIGHT: 220 LBS | HEIGHT: 69 IN

## 2025-09-02 DIAGNOSIS — K40.90 INGUINAL HERNIA WITHOUT OBSTRUCTION OR GANGRENE, RECURRENCE NOT SPECIFIED, UNSPECIFIED LATERALITY: Primary | ICD-10-CM

## 2025-09-02 DIAGNOSIS — K22.70 BARRETT'S ESOPHAGUS WITHOUT DYSPLASIA: Primary | ICD-10-CM

## 2025-09-02 DIAGNOSIS — K44.9 HIATAL HERNIA: ICD-10-CM

## 2025-09-02 LAB
ALBUMIN SERPL BCP-MCNC: 4.7 G/DL (ref 3.4–5)
ALP SERPL-CCNC: 55 U/L (ref 33–136)
ALT SERPL W P-5'-P-CCNC: 17 U/L (ref 10–52)
ANION GAP SERPL CALC-SCNC: 12 MMOL/L (ref 10–20)
APPEARANCE UR: CLEAR
AST SERPL W P-5'-P-CCNC: 20 U/L (ref 9–39)
ATRIAL RATE: 55 BPM
BASOPHILS # BLD AUTO: 0.03 X10*3/UL (ref 0–0.1)
BASOPHILS NFR BLD AUTO: 0.3 %
BILIRUB SERPL-MCNC: 0.7 MG/DL (ref 0–1.2)
BILIRUB UR STRIP.AUTO-MCNC: NEGATIVE MG/DL
BUN SERPL-MCNC: 19 MG/DL (ref 6–23)
CALCIUM SERPL-MCNC: 9.5 MG/DL (ref 8.6–10.3)
CARDIAC TROPONIN I PNL SERPL HS: 7 NG/L (ref 0–20)
CHLORIDE SERPL-SCNC: 104 MMOL/L (ref 98–107)
CO2 SERPL-SCNC: 29 MMOL/L (ref 21–32)
COLOR UR: ABNORMAL
CREAT SERPL-MCNC: 1.03 MG/DL (ref 0.5–1.3)
EGFRCR SERPLBLD CKD-EPI 2021: 81 ML/MIN/1.73M*2
EOSINOPHIL # BLD AUTO: 0.05 X10*3/UL (ref 0–0.7)
EOSINOPHIL NFR BLD AUTO: 0.5 %
ERYTHROCYTE [DISTWIDTH] IN BLOOD BY AUTOMATED COUNT: 11.6 % (ref 11.5–14.5)
GLUCOSE SERPL-MCNC: 135 MG/DL (ref 74–99)
GLUCOSE UR STRIP.AUTO-MCNC: NORMAL MG/DL
HCT VFR BLD AUTO: 49.8 % (ref 41–52)
HGB BLD-MCNC: 17.3 G/DL (ref 13.5–17.5)
IMM GRANULOCYTES # BLD AUTO: 0.04 X10*3/UL (ref 0–0.7)
IMM GRANULOCYTES NFR BLD AUTO: 0.4 % (ref 0–0.9)
KETONES UR STRIP.AUTO-MCNC: NEGATIVE MG/DL
LEUKOCYTE ESTERASE UR QL STRIP.AUTO: NEGATIVE
LIPASE SERPL-CCNC: 34 U/L (ref 9–82)
LYMPHOCYTES # BLD AUTO: 1.17 X10*3/UL (ref 1.2–4.8)
LYMPHOCYTES NFR BLD AUTO: 12.5 %
MCH RBC QN AUTO: 31.1 PG (ref 26–34)
MCHC RBC AUTO-ENTMCNC: 34.7 G/DL (ref 32–36)
MCV RBC AUTO: 90 FL (ref 80–100)
MONOCYTES # BLD AUTO: 0.51 X10*3/UL (ref 0.1–1)
MONOCYTES NFR BLD AUTO: 5.4 %
MUCOUS THREADS #/AREA URNS AUTO: NORMAL /LPF
NEUTROPHILS # BLD AUTO: 7.57 X10*3/UL (ref 1.2–7.7)
NEUTROPHILS NFR BLD AUTO: 80.9 %
NITRITE UR QL STRIP.AUTO: NEGATIVE
NRBC BLD-RTO: 0 /100 WBCS (ref 0–0)
P AXIS: 36 DEGREES
P OFFSET: 189 MS
P ONSET: 133 MS
PH UR STRIP.AUTO: 5.5 [PH]
PLATELET # BLD AUTO: 196 X10*3/UL (ref 150–450)
POTASSIUM SERPL-SCNC: 3.8 MMOL/L (ref 3.5–5.3)
PR INTERVAL: 164 MS
PROT SERPL-MCNC: 7.1 G/DL (ref 6.4–8.2)
PROT UR STRIP.AUTO-MCNC: NEGATIVE MG/DL
Q ONSET: 215 MS
QRS COUNT: 9 BEATS
QRS DURATION: 94 MS
QT INTERVAL: 452 MS
QTC CALCULATION(BAZETT): 432 MS
QTC FREDERICIA: 439 MS
R AXIS: 16 DEGREES
RBC # BLD AUTO: 5.56 X10*6/UL (ref 4.5–5.9)
RBC # UR STRIP.AUTO: ABNORMAL MG/DL
RBC #/AREA URNS AUTO: NORMAL /HPF
SODIUM SERPL-SCNC: 141 MMOL/L (ref 136–145)
SP GR UR STRIP.AUTO: 1.05
T AXIS: 19 DEGREES
T OFFSET: 441 MS
UROBILINOGEN UR STRIP.AUTO-MCNC: NORMAL MG/DL
VENTRICULAR RATE: 55 BPM
WBC # BLD AUTO: 9.4 X10*3/UL (ref 4.4–11.3)
WBC #/AREA URNS AUTO: NORMAL /HPF

## 2025-09-02 PROCEDURE — 74177 CT ABD & PELVIS W/CONTRAST: CPT

## 2025-09-02 PROCEDURE — 96375 TX/PRO/DX INJ NEW DRUG ADDON: CPT

## 2025-09-02 PROCEDURE — 74177 CT ABD & PELVIS W/CONTRAST: CPT | Performed by: RADIOLOGY

## 2025-09-02 PROCEDURE — RXMED WILLOW AMBULATORY MEDICATION CHARGE

## 2025-09-02 PROCEDURE — 2550000001 HC RX 255 CONTRASTS: Performed by: EMERGENCY MEDICINE

## 2025-09-02 PROCEDURE — 96374 THER/PROPH/DIAG INJ IV PUSH: CPT | Mod: 59

## 2025-09-02 PROCEDURE — 2500000004 HC RX 250 GENERAL PHARMACY W/ HCPCS (ALT 636 FOR OP/ED)

## 2025-09-02 PROCEDURE — 99285 EMERGENCY DEPT VISIT HI MDM: CPT | Mod: 25 | Performed by: EMERGENCY MEDICINE

## 2025-09-02 PROCEDURE — 36415 COLL VENOUS BLD VENIPUNCTURE: CPT

## 2025-09-02 PROCEDURE — 93005 ELECTROCARDIOGRAM TRACING: CPT

## 2025-09-02 PROCEDURE — 85025 COMPLETE CBC W/AUTO DIFF WBC: CPT

## 2025-09-02 PROCEDURE — 96361 HYDRATE IV INFUSION ADD-ON: CPT

## 2025-09-02 PROCEDURE — 80053 COMPREHEN METABOLIC PANEL: CPT

## 2025-09-02 PROCEDURE — 83690 ASSAY OF LIPASE: CPT

## 2025-09-02 PROCEDURE — 84484 ASSAY OF TROPONIN QUANT: CPT

## 2025-09-02 PROCEDURE — 81001 URINALYSIS AUTO W/SCOPE: CPT

## 2025-09-02 RX ORDER — ONDANSETRON HYDROCHLORIDE 2 MG/ML
4 INJECTION, SOLUTION INTRAVENOUS ONCE
Status: COMPLETED | OUTPATIENT
Start: 2025-09-02 | End: 2025-09-02

## 2025-09-02 RX ORDER — MORPHINE SULFATE 4 MG/ML
4 INJECTION, SOLUTION INTRAMUSCULAR; INTRAVENOUS ONCE
Status: COMPLETED | OUTPATIENT
Start: 2025-09-02 | End: 2025-09-02

## 2025-09-02 RX ORDER — ALUMINUM HYDROXIDE, MAGNESIUM HYDROXIDE, AND SIMETHICONE 1200; 120; 1200 MG/30ML; MG/30ML; MG/30ML
10 SUSPENSION ORAL EVERY 6 HOURS PRN
Qty: 355 ML | Refills: 1 | Status: SHIPPED | OUTPATIENT
Start: 2025-09-02 | End: 2025-09-12

## 2025-09-02 RX ADMIN — IOHEXOL 90 ML: 350 INJECTION, SOLUTION INTRAVENOUS at 07:52

## 2025-09-02 RX ADMIN — MORPHINE SULFATE 4 MG: 4 INJECTION, SOLUTION INTRAMUSCULAR; INTRAVENOUS at 06:51

## 2025-09-02 RX ADMIN — ONDANSETRON 4 MG: 2 INJECTION, SOLUTION INTRAMUSCULAR; INTRAVENOUS at 06:49

## 2025-09-02 RX ADMIN — SODIUM CHLORIDE, SODIUM LACTATE, POTASSIUM CHLORIDE, AND CALCIUM CHLORIDE 1000 ML: .6; .31; .03; .02 INJECTION, SOLUTION INTRAVENOUS at 06:32

## 2025-09-02 ASSESSMENT — LIFESTYLE VARIABLES
EVER FELT BAD OR GUILTY ABOUT YOUR DRINKING: NO
HAVE YOU EVER FELT YOU SHOULD CUT DOWN ON YOUR DRINKING: NO
TOTAL SCORE: 0
HAVE PEOPLE ANNOYED YOU BY CRITICIZING YOUR DRINKING: NO
EVER HAD A DRINK FIRST THING IN THE MORNING TO STEADY YOUR NERVES TO GET RID OF A HANGOVER: NO

## 2025-09-02 ASSESSMENT — PAIN DESCRIPTION - ONSET: ONSET: AWAKENED FROM SLEEP

## 2025-09-02 ASSESSMENT — PAIN DESCRIPTION - ORIENTATION: ORIENTATION: MID

## 2025-09-02 ASSESSMENT — PAIN - FUNCTIONAL ASSESSMENT: PAIN_FUNCTIONAL_ASSESSMENT: 0-10

## 2025-09-02 ASSESSMENT — PAIN DESCRIPTION - DESCRIPTORS
DESCRIPTORS: DULL
DESCRIPTORS: DULL

## 2025-09-02 ASSESSMENT — PAIN SCALES - GENERAL
PAINLEVEL_OUTOF10: 7
PAINLEVEL_OUTOF10: 4

## 2025-09-02 ASSESSMENT — PAIN DESCRIPTION - PAIN TYPE: TYPE: ACUTE PAIN

## 2025-09-02 ASSESSMENT — PAIN DESCRIPTION - LOCATION: LOCATION: ABDOMEN

## 2025-09-02 ASSESSMENT — PAIN DESCRIPTION - FREQUENCY: FREQUENCY: CONSTANT/CONTINUOUS

## 2025-09-03 LAB — HOLD SPECIMEN: NORMAL

## 2025-09-05 ENCOUNTER — APPOINTMENT (OUTPATIENT)
Dept: GASTROENTEROLOGY | Facility: EXTERNAL LOCATION | Age: 65
End: 2025-09-05
Payer: COMMERCIAL

## 2025-09-05 ENCOUNTER — PATIENT MESSAGE (OUTPATIENT)
Dept: PRIMARY CARE | Facility: CLINIC | Age: 65
End: 2025-09-05

## 2025-09-05 DIAGNOSIS — Z00.00 HEALTHCARE MAINTENANCE: Primary | ICD-10-CM

## 2025-09-15 ENCOUNTER — APPOINTMENT (OUTPATIENT)
Dept: PRIMARY CARE | Facility: CLINIC | Age: 65
End: 2025-09-15
Payer: COMMERCIAL

## 2025-10-23 ENCOUNTER — APPOINTMENT (OUTPATIENT)
Dept: GASTROENTEROLOGY | Facility: EXTERNAL LOCATION | Age: 65
End: 2025-10-23
Payer: COMMERCIAL

## 2025-11-04 ENCOUNTER — APPOINTMENT (OUTPATIENT)
Dept: GASTROENTEROLOGY | Facility: CLINIC | Age: 65
End: 2025-11-04
Payer: COMMERCIAL

## 2025-11-19 ENCOUNTER — APPOINTMENT (OUTPATIENT)
Dept: GASTROENTEROLOGY | Facility: EXTERNAL LOCATION | Age: 65
End: 2025-11-19
Payer: COMMERCIAL

## 2026-01-22 ENCOUNTER — APPOINTMENT (OUTPATIENT)
Dept: OPHTHALMOLOGY | Facility: CLINIC | Age: 66
End: 2026-01-22
Payer: COMMERCIAL